# Patient Record
Sex: MALE | Race: WHITE | NOT HISPANIC OR LATINO | Employment: FULL TIME | URBAN - METROPOLITAN AREA
[De-identification: names, ages, dates, MRNs, and addresses within clinical notes are randomized per-mention and may not be internally consistent; named-entity substitution may affect disease eponyms.]

---

## 2020-06-23 ENCOUNTER — LAB VISIT (OUTPATIENT)
Dept: PRIMARY CARE CLINIC | Facility: OTHER | Age: 45
End: 2020-06-23
Payer: COMMERCIAL

## 2020-06-23 DIAGNOSIS — Z03.818 ENCOUNTER FOR OBSERVATION FOR SUSPECTED EXPOSURE TO OTHER BIOLOGICAL AGENTS RULED OUT: ICD-10-CM

## 2020-06-23 PROCEDURE — U0003 INFECTIOUS AGENT DETECTION BY NUCLEIC ACID (DNA OR RNA); SEVERE ACUTE RESPIRATORY SYNDROME CORONAVIRUS 2 (SARS-COV-2) (CORONAVIRUS DISEASE [COVID-19]), AMPLIFIED PROBE TECHNIQUE, MAKING USE OF HIGH THROUGHPUT TECHNOLOGIES AS DESCRIBED BY CMS-2020-01-R: HCPCS

## 2020-06-26 DIAGNOSIS — U07.1 COVID-19 VIRUS DETECTED: ICD-10-CM

## 2020-06-26 LAB — SARS-COV-2 RNA RESP QL NAA+PROBE: DETECTED

## 2020-10-08 DIAGNOSIS — Z00.00 ROUTINE GENERAL MEDICAL EXAMINATION AT A HEALTH CARE FACILITY: Primary | ICD-10-CM

## 2020-10-26 ENCOUNTER — OFFICE VISIT (OUTPATIENT)
Dept: INTERNAL MEDICINE | Facility: CLINIC | Age: 45
End: 2020-10-26
Payer: COMMERCIAL

## 2020-10-26 ENCOUNTER — CLINICAL SUPPORT (OUTPATIENT)
Dept: INTERNAL MEDICINE | Facility: CLINIC | Age: 45
End: 2020-10-26
Payer: COMMERCIAL

## 2020-10-26 ENCOUNTER — HOSPITAL ENCOUNTER (OUTPATIENT)
Dept: CARDIOLOGY | Facility: HOSPITAL | Age: 45
Discharge: HOME | End: 2020-10-26
Attending: FAMILY MEDICINE
Payer: COMMERCIAL

## 2020-10-26 ENCOUNTER — HOSPITAL ENCOUNTER (OUTPATIENT)
Dept: RADIOLOGY | Facility: HOSPITAL | Age: 45
Discharge: HOME | End: 2020-10-26
Attending: FAMILY MEDICINE
Payer: COMMERCIAL

## 2020-10-26 ENCOUNTER — OFFICE VISIT (OUTPATIENT)
Dept: CARDIOLOGY | Facility: CLINIC | Age: 45
End: 2020-10-26
Payer: COMMERCIAL

## 2020-10-26 VITALS
HEART RATE: 66 BPM | WEIGHT: 191.56 LBS | OXYGEN SATURATION: 98 % | DIASTOLIC BLOOD PRESSURE: 68 MMHG | SYSTOLIC BLOOD PRESSURE: 122 MMHG | BODY MASS INDEX: 27.49 KG/M2

## 2020-10-26 VITALS
DIASTOLIC BLOOD PRESSURE: 71 MMHG | HEIGHT: 70 IN | TEMPERATURE: 97 F | HEART RATE: 71 BPM | OXYGEN SATURATION: 97 % | WEIGHT: 189.63 LBS | BODY MASS INDEX: 27.15 KG/M2 | SYSTOLIC BLOOD PRESSURE: 112 MMHG

## 2020-10-26 DIAGNOSIS — R94.31 ABNORMAL ELECTROCARDIOGRAM DURING EXERCISE STRESS TEST: ICD-10-CM

## 2020-10-26 DIAGNOSIS — Z00.00 ROUTINE GENERAL MEDICAL EXAMINATION AT A HEALTH CARE FACILITY: Primary | ICD-10-CM

## 2020-10-26 DIAGNOSIS — R93.1 ELEVATED CORONARY ARTERY CALCIUM SCORE: ICD-10-CM

## 2020-10-26 DIAGNOSIS — D75.838 THROMBOCYTOSIS AFTER SPLENECTOMY: ICD-10-CM

## 2020-10-26 DIAGNOSIS — Z00.00 PREVENTATIVE HEALTH CARE: Primary | ICD-10-CM

## 2020-10-26 DIAGNOSIS — Z86.718 HISTORY OF DVT OF LOWER EXTREMITY: Primary | ICD-10-CM

## 2020-10-26 DIAGNOSIS — D64.9 ANEMIA, UNSPECIFIED TYPE: ICD-10-CM

## 2020-10-26 DIAGNOSIS — Z86.718 HISTORY OF DVT OF LOWER EXTREMITY: ICD-10-CM

## 2020-10-26 DIAGNOSIS — Z86.16 HISTORY OF 2019 NOVEL CORONAVIRUS DISEASE (COVID-19): ICD-10-CM

## 2020-10-26 DIAGNOSIS — Z00.00 ROUTINE GENERAL MEDICAL EXAMINATION AT A HEALTH CARE FACILITY: ICD-10-CM

## 2020-10-26 DIAGNOSIS — Z90.81 ASPLENIA AFTER SURGICAL PROCEDURE: Chronic | ICD-10-CM

## 2020-10-26 DIAGNOSIS — Z90.81 HISTORY OF SPLENECTOMY: Chronic | ICD-10-CM

## 2020-10-26 DIAGNOSIS — Z82.49 FAMILY HISTORY OF ARTERIOSCLEROTIC CARDIOVASCULAR DISEASE: ICD-10-CM

## 2020-10-26 DIAGNOSIS — Z90.81 THROMBOCYTOSIS AFTER SPLENECTOMY: ICD-10-CM

## 2020-10-26 DIAGNOSIS — E80.6 HYPERBILIRUBINEMIA: ICD-10-CM

## 2020-10-26 PROBLEM — R79.89 LOW TESTOSTERONE IN MALE: Status: RESOLVED | Noted: 2019-12-23 | Resolved: 2020-10-26

## 2020-10-26 PROBLEM — R79.89 LOW TESTOSTERONE IN MALE: Status: ACTIVE | Noted: 2019-12-23

## 2020-10-26 PROBLEM — D56.3 BETA THALASSEMIA MINOR: Status: ACTIVE | Noted: 2020-10-26

## 2020-10-26 LAB
ALBUMIN SERPL BCP-MCNC: 4.1 G/DL (ref 3.5–5.2)
ALP SERPL-CCNC: 48 U/L (ref 55–135)
ALT SERPL W/O P-5'-P-CCNC: 23 U/L (ref 10–44)
ANION GAP SERPL CALC-SCNC: 9 MMOL/L (ref 8–16)
AST SERPL-CCNC: 19 U/L (ref 10–40)
BILIRUB SERPL-MCNC: 2.4 MG/DL (ref 0.1–1)
BUN SERPL-MCNC: 12 MG/DL (ref 6–20)
CALCIUM SERPL-MCNC: 9.1 MG/DL (ref 8.7–10.5)
CHLORIDE SERPL-SCNC: 104 MMOL/L (ref 95–110)
CHOLEST SERPL-MCNC: 184 MG/DL (ref 120–199)
CHOLEST/HDLC SERPL: 3.6 {RATIO} (ref 2–5)
CO2 SERPL-SCNC: 27 MMOL/L (ref 23–29)
COMPLEXED PSA SERPL-MCNC: 0.33 NG/ML (ref 0–4)
CREAT SERPL-MCNC: 0.9 MG/DL (ref 0.5–1.4)
CV STRESS BASE HR: 64 BPM
DIASTOLIC BLOOD PRESSURE: 72 MMHG
ERYTHROCYTE [DISTWIDTH] IN BLOOD BY AUTOMATED COUNT: 21.7 % (ref 11.5–14.5)
EST. GFR  (AFRICAN AMERICAN): >60 ML/MIN/1.73 M^2
EST. GFR  (NON AFRICAN AMERICAN): >60 ML/MIN/1.73 M^2
ESTIMATED AVG GLUCOSE: 82 MG/DL (ref 68–131)
GLUCOSE SERPL-MCNC: 97 MG/DL (ref 70–110)
HBA1C MFR BLD HPLC: 4.5 % (ref 4–5.6)
HCT VFR BLD AUTO: 37.4 % (ref 40–54)
HDLC SERPL-MCNC: 51 MG/DL (ref 40–75)
HDLC SERPL: 27.7 % (ref 20–50)
HGB BLD-MCNC: 11.8 G/DL (ref 14–18)
LDLC SERPL CALC-MCNC: 112 MG/DL (ref 63–159)
MCH RBC QN AUTO: 21.8 PG (ref 27–31)
MCHC RBC AUTO-ENTMCNC: 31.6 G/DL (ref 32–36)
MCV RBC AUTO: 69 FL (ref 82–98)
NONHDLC SERPL-MCNC: 133 MG/DL
OHS CV CPX 1 MINUTE RECOVERY HEART RATE: 106 BPM
OHS CV CPX 85 PERCENT MAX PREDICTED HEART RATE MALE: 149
OHS CV CPX ESTIMATED METS: 10
OHS CV CPX MAX PREDICTED HEART RATE: 175
OHS CV CPX PATIENT IS FEMALE: 0
OHS CV CPX PATIENT IS MALE: 1
OHS CV CPX PEAK DIASTOLIC BLOOD PRESSURE: 80 MMHG
OHS CV CPX PEAK HEAR RATE: 151 BPM
OHS CV CPX PEAK RATE PRESSURE PRODUCT: NORMAL
OHS CV CPX PEAK SYSTOLIC BLOOD PRESSURE: 188 MMHG
OHS CV CPX PERCENT MAX PREDICTED HEART RATE ACHIEVED: 86
OHS CV CPX RATE PRESSURE PRODUCT PRESENTING: 8704
PLATELET # BLD AUTO: 469 K/UL (ref 150–350)
PMV BLD AUTO: 10.7 FL (ref 9.2–12.9)
POTASSIUM SERPL-SCNC: 3.7 MMOL/L (ref 3.5–5.1)
PROT SERPL-MCNC: 7.1 G/DL (ref 6–8.4)
RBC # BLD AUTO: 5.42 M/UL (ref 4.6–6.2)
SODIUM SERPL-SCNC: 140 MMOL/L (ref 136–145)
STRESS ECHO POST EXERCISE DUR MIN: 8 MINUTES
STRESS ST DEPRESSION: 0.5 MM
SYSTOLIC BLOOD PRESSURE: 136 MMHG
TRIGL SERPL-MCNC: 105 MG/DL (ref 30–150)
TSH SERPL DL<=0.005 MIU/L-ACNC: 0.9 UIU/ML (ref 0.4–4)
WBC # BLD AUTO: 7.26 K/UL (ref 3.9–12.7)

## 2020-10-26 PROCEDURE — 93017 CV STRESS TEST TRACING ONLY: CPT

## 2020-10-26 PROCEDURE — 93016 EXERCISE STRESS - EKG (CUPID ONLY): ICD-10-PCS | Mod: ,,, | Performed by: INTERNAL MEDICINE

## 2020-10-26 PROCEDURE — 3008F BODY MASS INDEX DOCD: CPT | Mod: CPTII,S$GLB,, | Performed by: FAMILY MEDICINE

## 2020-10-26 PROCEDURE — 99204 OFFICE O/P NEW MOD 45 MIN: CPT | Mod: S$GLB,,, | Performed by: INTERNAL MEDICINE

## 2020-10-26 PROCEDURE — 99204 PR OFFICE/OUTPT VISIT, NEW, LEVL IV, 45-59 MIN: ICD-10-PCS | Mod: S$GLB,,, | Performed by: INTERNAL MEDICINE

## 2020-10-26 PROCEDURE — 83036 HEMOGLOBIN GLYCOSYLATED A1C: CPT

## 2020-10-26 PROCEDURE — 84153 ASSAY OF PSA TOTAL: CPT

## 2020-10-26 PROCEDURE — 99999 PR PBB SHADOW E&M-EST. PATIENT-LVL III: ICD-10-PCS | Mod: PBBFAC,,, | Performed by: FAMILY MEDICINE

## 2020-10-26 PROCEDURE — 99386 PREV VISIT NEW AGE 40-64: CPT | Mod: S$GLB,,, | Performed by: FAMILY MEDICINE

## 2020-10-26 PROCEDURE — 99999 PR PBB SHADOW E&M-EST. PATIENT-LVL III: CPT | Mod: PBBFAC,,, | Performed by: INTERNAL MEDICINE

## 2020-10-26 PROCEDURE — 85027 COMPLETE CBC AUTOMATED: CPT

## 2020-10-26 PROCEDURE — 3008F PR BODY MASS INDEX (BMI) DOCUMENTED: ICD-10-PCS | Mod: CPTII,S$GLB,, | Performed by: INTERNAL MEDICINE

## 2020-10-26 PROCEDURE — 3008F BODY MASS INDEX DOCD: CPT | Mod: CPTII,S$GLB,, | Performed by: INTERNAL MEDICINE

## 2020-10-26 PROCEDURE — 84443 ASSAY THYROID STIM HORMONE: CPT

## 2020-10-26 PROCEDURE — 93016 CV STRESS TEST SUPVJ ONLY: CPT | Mod: ,,, | Performed by: INTERNAL MEDICINE

## 2020-10-26 PROCEDURE — 75571 CT CALCIUM SCORING CARDIAC: ICD-10-PCS | Mod: 26,,, | Performed by: RADIOLOGY

## 2020-10-26 PROCEDURE — 99999 PR PBB SHADOW E&M-EST. PATIENT-LVL III: ICD-10-PCS | Mod: PBBFAC,,, | Performed by: INTERNAL MEDICINE

## 2020-10-26 PROCEDURE — 80061 LIPID PANEL: CPT

## 2020-10-26 PROCEDURE — 75571 CT HRT W/O DYE W/CA TEST: CPT | Mod: 26,,, | Performed by: RADIOLOGY

## 2020-10-26 PROCEDURE — 75571 CT HRT W/O DYE W/CA TEST: CPT | Mod: TC

## 2020-10-26 PROCEDURE — 99386 PR PREVENTIVE VISIT,NEW,40-64: ICD-10-PCS | Mod: S$GLB,,, | Performed by: FAMILY MEDICINE

## 2020-10-26 PROCEDURE — 80053 COMPREHEN METABOLIC PANEL: CPT

## 2020-10-26 PROCEDURE — 99999 PR PBB SHADOW E&M-EST. PATIENT-LVL III: CPT | Mod: PBBFAC,,, | Performed by: FAMILY MEDICINE

## 2020-10-26 PROCEDURE — 3008F PR BODY MASS INDEX (BMI) DOCUMENTED: ICD-10-PCS | Mod: CPTII,S$GLB,, | Performed by: FAMILY MEDICINE

## 2020-10-26 NOTE — LETTER
SAGE Rogel MD  [facility] [address] [address]  PH: [phone]    FX: [phone]          2020        Mihir Solorio MD  [address] [facility] [address]        RE: Zach Khan,  1975    (our MRN [id number])      Dear Dr. Solorio:    I saw Zach Khan 10/26/2020 as part of [facility] program.    Zach identified you as his primary care provider, and he asked me to send you a copy of his recent test results. A copy is enclosed.    The primary encounter diagnosis was Preventative health care. Diagnoses of History of COVD-19 (2020), Anemia, unspecified type, Thrombocytosis after splenectomy, History of DVT of lower extremity, History of splenectomy, Family history of arteriosclerotic cardiovascular disease, Mildly elevated coronary artery calcium score, Nonspecific abnormal exercise cardiac stress test, and Hyperbilirubinemia were also pertinent to this visit.     If we identified any problems or concerns during his Executive Health exam, I instructed Zach to discuss these with you.    Please call me if you have any questions or if I can be of any further service.    Kind regards,     SAGE Rogel MD    Enclosure    Lab Test Results for Zach Khan,  1975    Clinical Support on 10/26/2020   Component Date Value Ref Range Status    Sodium 10/26/2020 140  136 - 145 mmol/L Final    Potassium 10/26/2020 3.7  3.5 - 5.1 mmol/L Final    Chloride 10/26/2020 104  95 - 110 mmol/L Final    CO2 10/26/2020 27  23 - 29 mmol/L Final    Glucose 10/26/2020 97  70 - 110 mg/dL Final    BUN 10/26/2020 12  6 - 20 mg/dL Final    Creatinine 10/26/2020 0.9  0.5 - 1.4 mg/dL Final    Calcium 10/26/2020 9.1  8.7 - 10.5 mg/dL Final    Total Protein 10/26/2020 7.1  6.0 - 8.4 g/dL Final    Albumin 10/26/2020 4.1  3.5 - 5.2 g/dL Final    Total Bilirubin 10/26/2020 2.4* 0.1 -  1.0 mg/dL Final    Alkaline Phosphatase 10/26/2020 48* 55 - 135 U/L Final    AST 10/26/2020 19  10 - 40 U/L Final    ALT 10/26/2020 23  10 - 44 U/L Final    Anion Gap 10/26/2020 9  8 - 16 mmol/L Final    eGFR if African American 10/26/2020 >60  >60 mL/min/1.73 m^2 Final    eGFR if non African American 10/26/2020 >60  >60 mL/min/1.73 m^2 Final    WBC 10/26/2020 7.26  3.90 - 12.70 K/uL Final    RBC 10/26/2020 5.42  4.60 - 6.20 M/uL Final    Hemoglobin 10/26/2020 11.8* 14.0 - 18.0 g/dL Final    Hematocrit 10/26/2020 37.4* 40.0 - 54.0 % Final    MCV 10/26/2020 69* 82 - 98 fL Final    MCH 10/26/2020 21.8* 27.0 - 31.0 pg Final    MCHC 10/26/2020 31.6* 32.0 - 36.0 g/dL Final    RDW 10/26/2020 21.7* 11.5 - 14.5 % Final    Platelets 10/26/2020 469* 150 - 350 K/uL Final    MPV 10/26/2020 10.7  9.2 - 12.9 fL Final    Cholesterol 10/26/2020 184  120 - 199 mg/dL Final    Triglycerides 10/26/2020 105  30 - 150 mg/dL Final    HDL 10/26/2020 51  40 - 75 mg/dL Final    LDL Cholesterol 10/26/2020 112.0  63.0 - 159.0 mg/dL Final    HDL/Cholesterol Ratio 10/26/2020 27.7  20.0 - 50.0 % Final    Total Cholesterol/HDL Ratio 10/26/2020 3.6  2.0 - 5.0 Final    Non-HDL Cholesterol 10/26/2020 133  mg/dL Final    TSH 10/26/2020 0.899  0.400 - 4.000 uIU/mL Final    Hemoglobin A1C 10/26/2020 4.5  4.0 - 5.6 % Final    Estimated Avg Glucose 10/26/2020 82  68 - 131 mg/dL Final    PSA, Screen 10/26/2020 0.33  0.00 - 4.00 ng/mL Final        CT Coronary Calcium Score Results for Zach Rohit Cloud,  1975    Results for orders placed during the hospital encounter of 10/26/20   CT Calcium Scoring Cardiac    Narrative EXAMINATION: CT CALCIUM SCORING CARDIAC    CLINICAL HISTORY: CAD risk, low, asymptomatic;  Encounter for general adult medical examination without abnormal findings    TECHNIQUE: Gated,  low dose axial images were performed over the heart.    COMPARISON: None.    FINDINGS: Important information about your  scan:    The following information is based on analysis of the coronary arteries only.  Calcium deposits do not correspond directly to the percentage of narrowing of the arteries.  They do correlate directly to the amount of coronary plaque, and to the risk of future coronary disease.  These calcium deposits usually begin to form years before any symptoms develop.  Early detection and modification of risk factors, such as smoking and cholesterol intake, and slow progression of coronary artery disease.    A low score suggests a low likelihood of coronary artery disease, but does not exclude the possibility of significant coronary artery narrowing.  The results should be discussed with your physician taking into account other risk factors such as age, gender, family history, diabetes, smoking or high cholesterol levels.    Should you experience chest pain, difficulty breathing, discomfort radiating into her neck or arm, or discomfort combined with lightheadedness, sweating, fainting or nausea, you should seek prompt medical attention.    Calcium score:  0-10: Very little coronary heart disease risk  11 through 100: Low to moderate coronary heart disease risk  101 through 400: Moderate to high coronary heart disease risk  Greater than 401: High coronary heart disease risk.    SCORE SUMMARY: Your total calcium score is 20    Incidental findings: Granuloma identified within the anterolateral margin superior segment left lower lobe.  Additional calcifications identified the left hilar region most consistent with small calcified nodes.  Findings consistent with prior granulomatous disease.  Additional finding postsurgical changes left upper quadrant abdomen.     Impression Your total calcium score is 20. (Low to Moderate coronary heart disease risk.)    Findings consistent with prior granulomatous disease.    Electronically signed by: Crow Flores MD  Date:    10/26/2020  Time:    08:37         Exercise Stress  Electrocardiogram Results for Zach Khan,  1975    Results for orders placed during the hospital encounter of 10/26/20   Exercise Stress - EKG    Narrative   The EKG portion of this study is abnormal but not diagnostic.    The patient reported no chest pain during the stress test.    The blood pressure response to stress was normal.    The exercise capacity was normal.    Lateral T wave inversions with ST depressions resolved in recovery

## 2020-10-26 NOTE — PROGRESS NOTES
Dear Zach,    Thank you for allowing me to care for you and perform your Executive Health exam on 10/26/2020. It was a pleasure meeting you.    This letter and the accompanying report will serve as a summary of the medical history you provided, your physical exam findings, and your test results. As you requested, I'm sending a copy of your test results to your PCP (primary care provider), Mihir Solorio MD. Please discuss the results of your cardiology evaluation with your PCP.    Thanks for letting me care for you, and thanks for trusting [facility] with your healthcare needs.    I look forward to the next time I can serve you. Until then, take care, and be well.    Warmest regards,     SAGE Rogel MD   EXECUTIVE HEALTH ENCOUNTER      REASON FOR VISIT  EXECUTIVE HEALTH    HEALTH MAINTENANCE INTERVENTIONS - UP TO DATE  Health Maintenance Topics with due status: Not Due       Topic Last Completion Date    Lipid Panel 10/26/2020       HEALTH MAINTENANCE INTERVENTIONS - DUE OR DUE SOON  Health Maintenance Due   Topic Date Due    Hepatitis C Screening  1975    HIV Screening  01/07/1990    TETANUS VACCINE  01/07/1993    Pneumococcal Vaccine (Highest Risk) (3 of 3 - PPSV23) 06/27/2016       PCP (Primary Care Provider)  Zach identifies his PCP as Mihir Solorio MD.    ISSUES  The following issues were identified and addressed.  1. Preventative health care    2. History of COVD-19 (06/23/2020)    3. Anemia, unspecified type    4. Thrombocytosis after splenectomy    5. History of DVT of lower extremity    6. History of splenectomy    7. Family history of arteriosclerotic cardiovascular disease    8. Mildly elevated coronary artery calcium score    9. Nonspecific abnormal exercise cardiac stress test    10. Hyperbilirubinemia        Problem List Items Addressed This Visit     Anemia    Current Assessment & Plan     Lab Results   Component Value Date    HGB 11.8 (L) 10/26/2020    HGB 12.5 (L) 06/07/2012    HCT  37.4 (L) 10/26/2020    HCT 38.8 (L) 06/07/2012   Chronic, asymptomatic, believed secondary to beta-thalassemia minor.         History of COVD-19 (06/23/2020)    Current Assessment & Plan     He appears to have recovered well.         History of DVT of lower extremity    Overview     Spontaneous -- treated with Xarelto.  Hematologist Dr. Steele did hypercoagulability work-up, which was negative. He was treated with Xarelto for just under 1 year. He was recommended to continue Xarelto life-long, but he declined.         Relevant Orders    Ambulatory referral/consult to Cardiology    History of splenectomy (Chronic)    Overview     Related to childhood trauma.         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         Current Assessment & Plan     Lab Results   Component Value Date    ALKPHOS 48 (L) 10/26/2020    ALKPHOS 48 (L) 06/07/2012    BILITOT 2.4 (H) 10/26/2020    BILITOT 1.6 (H) 06/07/2012   Asymptomatic. Believed to be secondary to asplenia.         Mildly elevated coronary artery calcium score    Current Assessment & Plan     Asymptomatic.         Relevant Orders    Ambulatory referral/consult to Cardiology    Nonspecific abnormal exercise cardiac stress test    Relevant Orders    Ambulatory referral/consult to Cardiology    Thrombocytosis after splenectomy    Current Assessment & Plan     Lab Results   Component Value Date     (H) 10/26/2020     (H) 06/07/2012   Chronic, asymptomatic, stable.            Other Visit Diagnoses     Preventative health care    -  Primary    Family history of arteriosclerotic cardiovascular disease        Relevant Orders    Ambulatory referral/consult to Cardiology          DATA SUMMARY    LABORATORY:    CBC: The complete blood count (CBC) checks for anemia and measures the red blood cells, white blood cells, and platelets in your blood.  o YOUR RESULTS: NORMAL or at least ACCEPTABLE. No further evaluation or treatment  required at this time.     CMP: The comprehensive metabolic panel (CMP) checks kidney function, liver function, sodium, potassium, glucose (sugar) and other aspects of your metabolism.  o YOUR RESULTS: NORMAL or at least ACCEPTABLE. No further evaluation or treatment required at this time.     Lipid Panel: The lipid panel measures the levels of different types of fatty substances in your blood (cholesterol and triglycerides) that can contribute to plaque buildup in your arteries (atherosclerosis).  o YOUR RESULTS: NORMAL. No further evaluation or treatment required.     TSH: The thyroid is a gland in the neck that helps regulate metabolism. The thyroid stimulating hormone (TSH) is a measure of your thyroid activity.  o YOUR RESULTS: NORMAL. No further evaluation or treatment required.     A1c: The hemoglobin A1c (A1c) is a test that evaluates and screens for diabetes.  o YOUR RESULTS: NORMAL. No further evaluation or treatment required.      CARDIOPULMONARY:   Exercise Cardiac Stress Test: This is an electrocardiogram that is done while you are exercising, such as walking on a treadmill. It is a test for assessing risk for coronary artery disease (narrowing or blockage of the arteries that provide oxygen and nutrients to the heart).  o YOUR RESULTS: MILDLY ABNORMAL. You have been referred to cardiology for further evaluation.  You need to discuss this test result with your primary care provider (PCP) within 3 months.     CT Cardiac Calcium Scoring:  This is a test that calculates your risk of developing coronary artery disease (CAD) by measuring the amount of calcified plaque in the coronary arteries.  o YOUR RESULTS: MILDLY ABNORMAL. You have been referred to cardiology for further evaluation.  You need to discuss this test result with your primary care provider (PCP) within 3 months.      CANCER SCREENING:   PSA: Prostate specific antigen (PSA) is a test used to screen for and monitor prostate cancer in  "men.  o YOUR RESULTS: NORMAL. No further evaluation or treatment required.      PHYSICAL EXAM  His body mass index is 27.2 kg/m². His  height is 5' 10" (1.778 m) and weight is 86 kg (189 lb 9.5 oz). His tympanic temperature is 97 °F (36.1 °C). His blood pressure is 112/71 and his pulse is 71. His oxygen saturation is 97%.    Physical Exam  Vitals signs reviewed.   Constitutional:       General: He is not in acute distress.     Appearance: Normal appearance.   Eyes:      General: No scleral icterus.     Conjunctiva/sclera: Conjunctivae normal.   Neck:      Musculoskeletal: No muscular tenderness.      Vascular: No carotid bruit.   Cardiovascular:      Rate and Rhythm: Normal rate and regular rhythm.      Heart sounds: Normal heart sounds.   Pulmonary:      Effort: Pulmonary effort is normal. No respiratory distress.      Breath sounds: Normal breath sounds. No wheezing or rhonchi.   Abdominal:      General: Bowel sounds are normal. There is no distension.      Palpations: Abdomen is soft. There is no mass.      Tenderness: There is no abdominal tenderness.   Lymphadenopathy:      Cervical: No cervical adenopathy.   Skin:     General: Skin is warm and dry.      Coloration: Skin is not jaundiced.   Neurological:      General: No focal deficit present.      Mental Status: He is alert and oriented to person, place, and time.   Psychiatric:         Mood and Affect: Mood normal.         Behavior: Behavior normal.         Judgment: Judgment normal.         MEDICATIONS  He currently has no medications in their medication list.  There are no discontinued medications.       REVIEW OF SYSTEMS  Review of Systems   Constitutional: Negative for chills and fever.   HENT: Negative for ear pain and trouble swallowing.    Eyes: Negative for pain and visual disturbance.   Respiratory: Negative for chest tightness and shortness of breath.    Cardiovascular: Negative for chest pain and palpitations.   Gastrointestinal: Negative for " "abdominal pain and blood in stool.   Endocrine: Negative for polydipsia and polyuria.   Genitourinary: Negative.  Negative for difficulty urinating, dysuria and hematuria.   Musculoskeletal: Negative for gait problem and joint swelling.   Integumentary:  Negative for rash and wound.   Neurological: Negative for vertigo and syncope.   Hematological: Negative.    Psychiatric/Behavioral: Negative for agitation and confusion.       FOLLOW-UP  He is to follow up with his PCP for any "Health Maintenance Interventions - Due Or Due Soon" listed above, for any unresolved issues addressed during this encounter, and for any new complaints or concerns.    PAST MEDICAL HISTORY  He has a past medical history of Beta thalassemia minor, History of COVD-19 (2020), History of splenectomy, and Mildly elevated coronary artery calcium score.    SURGICAL HISTORY  He has a past surgical history that includes Hernia repair and spline removal.    FAMILY HISTORY  His family history includes No Known Problems in his father and mother.     ALLERGIES  He has No Known Allergies.    SOCIAL HISTORY   TOBACCO USE HISTORY: He reports that he has never smoked. He does not have any smokeless tobacco history on file.   ALCOHOL USE HISTORY:  He reports current alcohol use.    Documentation entered by me for this encounter may have been done in part using speech-recognition technology. Although I have made an effort to ensure accuracy, "sound like" errors may exist and should be interpreted in context. -SAGE Rogel MD.       Lab Test Results for Zach Khan,  1975    Clinical Support on 10/26/2020   Component Date Value Ref Range Status    Sodium 10/26/2020 140  136 - 145 mmol/L Final    Potassium 10/26/2020 3.7  3.5 - 5.1 mmol/L Final    Chloride 10/26/2020 104  95 - 110 mmol/L Final    CO2 10/26/2020 27  23 - 29 mmol/L Final    Glucose 10/26/2020 97  70 - 110 mg/dL Final    BUN 10/26/2020 12  6 - 20 mg/dL Final    " Creatinine 10/26/2020 0.9  0.5 - 1.4 mg/dL Final    Calcium 10/26/2020 9.1  8.7 - 10.5 mg/dL Final    Total Protein 10/26/2020 7.1  6.0 - 8.4 g/dL Final    Albumin 10/26/2020 4.1  3.5 - 5.2 g/dL Final    Total Bilirubin 10/26/2020 2.4* 0.1 - 1.0 mg/dL Final    Alkaline Phosphatase 10/26/2020 48* 55 - 135 U/L Final    AST 10/26/2020 19  10 - 40 U/L Final    ALT 10/26/2020 23  10 - 44 U/L Final    Anion Gap 10/26/2020 9  8 - 16 mmol/L Final    eGFR if African American 10/26/2020 >60  >60 mL/min/1.73 m^2 Final    eGFR if non African American 10/26/2020 >60  >60 mL/min/1.73 m^2 Final    WBC 10/26/2020 7.26  3.90 - 12.70 K/uL Final    RBC 10/26/2020 5.42  4.60 - 6.20 M/uL Final    Hemoglobin 10/26/2020 11.8* 14.0 - 18.0 g/dL Final    Hematocrit 10/26/2020 37.4* 40.0 - 54.0 % Final    MCV 10/26/2020 69* 82 - 98 fL Final    MCH 10/26/2020 21.8* 27.0 - 31.0 pg Final    MCHC 10/26/2020 31.6* 32.0 - 36.0 g/dL Final    RDW 10/26/2020 21.7* 11.5 - 14.5 % Final    Platelets 10/26/2020 469* 150 - 350 K/uL Final    MPV 10/26/2020 10.7  9.2 - 12.9 fL Final    Cholesterol 10/26/2020 184  120 - 199 mg/dL Final    Triglycerides 10/26/2020 105  30 - 150 mg/dL Final    HDL 10/26/2020 51  40 - 75 mg/dL Final    LDL Cholesterol 10/26/2020 112.0  63.0 - 159.0 mg/dL Final    HDL/Cholesterol Ratio 10/26/2020 27.7  20.0 - 50.0 % Final    Total Cholesterol/HDL Ratio 10/26/2020 3.6  2.0 - 5.0 Final    Non-HDL Cholesterol 10/26/2020 133  mg/dL Final    TSH 10/26/2020 0.899  0.400 - 4.000 uIU/mL Final    Hemoglobin A1C 10/26/2020 4.5  4.0 - 5.6 % Final    Estimated Avg Glucose 10/26/2020 82  68 - 131 mg/dL Final    PSA, Screen 10/26/2020 0.33  0.00 - 4.00 ng/mL Final        CT Coronary Calcium Score Results for Danny Ramos 1975    Results for orders placed during the hospital encounter of 10/26/20   CT Calcium Scoring Cardiac    Narrative EXAMINATION: CT CALCIUM SCORING CARDIAC    CLINICAL HISTORY:  CAD risk, low, asymptomatic;  Encounter for general adult medical examination without abnormal findings    TECHNIQUE: Gated,  low dose axial images were performed over the heart.    COMPARISON: None.    FINDINGS: Important information about your scan:    The following information is based on analysis of the coronary arteries only.  Calcium deposits do not correspond directly to the percentage of narrowing of the arteries.  They do correlate directly to the amount of coronary plaque, and to the risk of future coronary disease.  These calcium deposits usually begin to form years before any symptoms develop.  Early detection and modification of risk factors, such as smoking and cholesterol intake, and slow progression of coronary artery disease.    A low score suggests a low likelihood of coronary artery disease, but does not exclude the possibility of significant coronary artery narrowing.  The results should be discussed with your physician taking into account other risk factors such as age, gender, family history, diabetes, smoking or high cholesterol levels.    Should you experience chest pain, difficulty breathing, discomfort radiating into her neck or arm, or discomfort combined with lightheadedness, sweating, fainting or nausea, you should seek prompt medical attention.    Calcium score:  0-10: Very little coronary heart disease risk  11 through 100: Low to moderate coronary heart disease risk  101 through 400: Moderate to high coronary heart disease risk  Greater than 401: High coronary heart disease risk.    SCORE SUMMARY: Your total calcium score is 20    Incidental findings: Granuloma identified within the anterolateral margin superior segment left lower lobe.  Additional calcifications identified the left hilar region most consistent with small calcified nodes.  Findings consistent with prior granulomatous disease.  Additional finding postsurgical changes left upper quadrant abdomen.     Impression Your  total calcium score is 20. (Low to Moderate coronary heart disease risk.)    Findings consistent with prior granulomatous disease.    Electronically signed by: Crow Flores MD  Date:    10/26/2020  Time:    08:37         Exercise Stress Electrocardiogram Results for Zach Bee Danny Khan 1975    Results for orders placed during the hospital encounter of 10/26/20   Exercise Stress - EKG    Narrative   The EKG portion of this study is abnormal but not diagnostic.    The patient reported no chest pain during the stress test.    The blood pressure response to stress was normal.    The exercise capacity was normal.    Lateral T wave inversions with ST depressions resolved in recovery

## 2020-10-26 NOTE — LETTER
Dear Zach,    Thank you for allowing me to care for you and perform your Executive Health exam on 10/26/2020. It was a pleasure meeting you.    This letter and the accompanying report will serve as a summary of the medical history you provided, your physical exam findings, and your test results. As you requested, I'm sending a copy of your test results to your PCP (primary care provider), Mihir Solorio MD. Please discuss the results of your cardiology evaluation with your PCP.    Thanks for letting me care for you, and thanks for trusting [facility] with your healthcare needs.    I look forward to the next time I can serve you. Until then, take care, and be well.    Warmest regards,     SAGE Rogel MD   EXECUTIVE HEALTH ENCOUNTER      REASON FOR VISIT  EXECUTIVE HEALTH    HEALTH MAINTENANCE INTERVENTIONS - UP TO DATE  Health Maintenance Topics with due status: Not Due       Topic Last Completion Date    Lipid Panel 10/26/2020       HEALTH MAINTENANCE INTERVENTIONS - DUE OR DUE SOON  Health Maintenance Due   Topic Date Due    Hepatitis C Screening  1975    HIV Screening  01/07/1990    TETANUS VACCINE  01/07/1993    Pneumococcal Vaccine (Highest Risk) (3 of 3 - PPSV23) 06/27/2016       PCP (Primary Care Provider)  Zach identifies his PCP as Mihir Solorio MD.    ISSUES  The following issues were identified and addressed.  1. Preventative health care    2. History of COVD-19 (06/23/2020)    3. Anemia, unspecified type    4. Thrombocytosis after splenectomy    5. History of DVT of lower extremity    6. History of splenectomy    7. Family history of arteriosclerotic cardiovascular disease    8. Mildly elevated coronary artery calcium score    9. Nonspecific abnormal exercise cardiac stress test    10. Hyperbilirubinemia        Problem List Items Addressed This Visit     Anemia    Current Assessment & Plan     Lab Results   Component Value Date    HGB 11.8 (L) 10/26/2020    HGB 12.5 (L) 06/07/2012     HCT 37.4 (L) 10/26/2020    HCT 38.8 (L) 06/07/2012   Chronic, asymptomatic, believed secondary to beta-thalassemia minor.         History of COVD-19 (06/23/2020)    Current Assessment & Plan     He appears to have recovered well.         History of DVT of lower extremity    Overview     Spontaneous -- treated with Xarelto.  Hematologist Dr. Steele did hypercoagulability work-up, which was negative. He was treated with Xarelto for just under 1 year. He was recommended to continue Xarelto life-long, but he declined.         Relevant Orders    Ambulatory referral/consult to Cardiology    History of splenectomy (Chronic)    Overview     Related to childhood trauma.         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         Current Assessment & Plan     Lab Results   Component Value Date    ALKPHOS 48 (L) 10/26/2020    ALKPHOS 48 (L) 06/07/2012    BILITOT 2.4 (H) 10/26/2020    BILITOT 1.6 (H) 06/07/2012   Asymptomatic. Believed to be secondary to asplenia.         Mildly elevated coronary artery calcium score    Current Assessment & Plan     Asymptomatic.         Relevant Orders    Ambulatory referral/consult to Cardiology    Nonspecific abnormal exercise cardiac stress test    Relevant Orders    Ambulatory referral/consult to Cardiology    Thrombocytosis after splenectomy    Current Assessment & Plan     Lab Results   Component Value Date     (H) 10/26/2020     (H) 06/07/2012   Chronic, asymptomatic, stable.            Other Visit Diagnoses     Preventative health care    -  Primary    Family history of arteriosclerotic cardiovascular disease        Relevant Orders    Ambulatory referral/consult to Cardiology          DATA SUMMARY    LABORATORY:    CBC: The complete blood count (CBC) checks for anemia and measures the red blood cells, white blood cells, and platelets in your blood.  o YOUR RESULTS: NORMAL or at least ACCEPTABLE. No further evaluation or  treatment required at this time.     CMP: The comprehensive metabolic panel (CMP) checks kidney function, liver function, sodium, potassium, glucose (sugar) and other aspects of your metabolism.  o YOUR RESULTS: NORMAL or at least ACCEPTABLE. No further evaluation or treatment required at this time.     Lipid Panel: The lipid panel measures the levels of different types of fatty substances in your blood (cholesterol and triglycerides) that can contribute to plaque buildup in your arteries (atherosclerosis).  o YOUR RESULTS: NORMAL. No further evaluation or treatment required.     TSH: The thyroid is a gland in the neck that helps regulate metabolism. The thyroid stimulating hormone (TSH) is a measure of your thyroid activity.  o YOUR RESULTS: NORMAL. No further evaluation or treatment required.     A1c: The hemoglobin A1c (A1c) is a test that evaluates and screens for diabetes.  o YOUR RESULTS: NORMAL. No further evaluation or treatment required.      CARDIOPULMONARY:   Exercise Cardiac Stress Test: This is an electrocardiogram that is done while you are exercising, such as walking on a treadmill. It is a test for assessing risk for coronary artery disease (narrowing or blockage of the arteries that provide oxygen and nutrients to the heart).  o YOUR RESULTS: MILDLY ABNORMAL. You have been referred to cardiology for further evaluation.  You need to discuss this test result with your primary care provider (PCP) within 3 months.     CT Cardiac Calcium Scoring:  This is a test that calculates your risk of developing coronary artery disease (CAD) by measuring the amount of calcified plaque in the coronary arteries.  o YOUR RESULTS: MILDLY ABNORMAL. You have been referred to cardiology for further evaluation.  You need to discuss this test result with your primary care provider (PCP) within 3 months.      CANCER SCREENING:   PSA: Prostate specific antigen (PSA) is a test used to screen for and monitor prostate  "cancer in men.  o YOUR RESULTS: NORMAL. No further evaluation or treatment required.      PHYSICAL EXAM  His body mass index is 27.2 kg/m². His  height is 5' 10" (1.778 m) and weight is 86 kg (189 lb 9.5 oz). His tympanic temperature is 97 °F (36.1 °C). His blood pressure is 112/71 and his pulse is 71. His oxygen saturation is 97%.    Physical Exam  Vitals signs reviewed.   Constitutional:       General: He is not in acute distress.     Appearance: Normal appearance.   Eyes:      General: No scleral icterus.     Conjunctiva/sclera: Conjunctivae normal.   Neck:      Musculoskeletal: No muscular tenderness.      Vascular: No carotid bruit.   Cardiovascular:      Rate and Rhythm: Normal rate and regular rhythm.      Heart sounds: Normal heart sounds.   Pulmonary:      Effort: Pulmonary effort is normal. No respiratory distress.      Breath sounds: Normal breath sounds. No wheezing or rhonchi.   Abdominal:      General: Bowel sounds are normal. There is no distension.      Palpations: Abdomen is soft. There is no mass.      Tenderness: There is no abdominal tenderness.   Lymphadenopathy:      Cervical: No cervical adenopathy.   Skin:     General: Skin is warm and dry.      Coloration: Skin is not jaundiced.   Neurological:      General: No focal deficit present.      Mental Status: He is alert and oriented to person, place, and time.   Psychiatric:         Mood and Affect: Mood normal.         Behavior: Behavior normal.         Judgment: Judgment normal.         MEDICATIONS  He currently has no medications in their medication list.  There are no discontinued medications.       REVIEW OF SYSTEMS  Review of Systems   Constitutional: Negative for chills and fever.   HENT: Negative for ear pain and trouble swallowing.    Eyes: Negative for pain and visual disturbance.   Respiratory: Negative for chest tightness and shortness of breath.    Cardiovascular: Negative for chest pain and palpitations.   Gastrointestinal: Negative " "for abdominal pain and blood in stool.   Endocrine: Negative for polydipsia and polyuria.   Genitourinary: Negative.  Negative for difficulty urinating, dysuria and hematuria.   Musculoskeletal: Negative for gait problem and joint swelling.   Integumentary:  Negative for rash and wound.   Neurological: Negative for vertigo and syncope.   Hematological: Negative.    Psychiatric/Behavioral: Negative for agitation and confusion.       FOLLOW-UP  He is to follow up with his PCP for any "Health Maintenance Interventions - Due Or Due Soon" listed above, for any unresolved issues addressed during this encounter, and for any new complaints or concerns.    PAST MEDICAL HISTORY  He has a past medical history of Beta thalassemia minor, History of COVD-19 (2020), History of splenectomy, and Mildly elevated coronary artery calcium score.    SURGICAL HISTORY  He has a past surgical history that includes Hernia repair and spline removal.    FAMILY HISTORY  His family history includes No Known Problems in his father and mother.     ALLERGIES  He has No Known Allergies.    SOCIAL HISTORY   TOBACCO USE HISTORY: He reports that he has never smoked. He does not have any smokeless tobacco history on file.   ALCOHOL USE HISTORY:  He reports current alcohol use.    Documentation entered by me for this encounter may have been done in part using speech-recognition technology. Although I have made an effort to ensure accuracy, "sound like" errors may exist and should be interpreted in context. -SAGE Rogel MD.       Lab Test Results for Zach Khan,  1975    Clinical Support on 10/26/2020   Component Date Value Ref Range Status    Sodium 10/26/2020 140  136 - 145 mmol/L Final    Potassium 10/26/2020 3.7  3.5 - 5.1 mmol/L Final    Chloride 10/26/2020 104  95 - 110 mmol/L Final    CO2 10/26/2020 27  23 - 29 mmol/L Final    Glucose 10/26/2020 97  70 - 110 mg/dL Final    BUN 10/26/2020 12  6 - 20 mg/dL Final    " Creatinine 10/26/2020 0.9  0.5 - 1.4 mg/dL Final    Calcium 10/26/2020 9.1  8.7 - 10.5 mg/dL Final    Total Protein 10/26/2020 7.1  6.0 - 8.4 g/dL Final    Albumin 10/26/2020 4.1  3.5 - 5.2 g/dL Final    Total Bilirubin 10/26/2020 2.4* 0.1 - 1.0 mg/dL Final    Alkaline Phosphatase 10/26/2020 48* 55 - 135 U/L Final    AST 10/26/2020 19  10 - 40 U/L Final    ALT 10/26/2020 23  10 - 44 U/L Final    Anion Gap 10/26/2020 9  8 - 16 mmol/L Final    eGFR if African American 10/26/2020 >60  >60 mL/min/1.73 m^2 Final    eGFR if non African American 10/26/2020 >60  >60 mL/min/1.73 m^2 Final    WBC 10/26/2020 7.26  3.90 - 12.70 K/uL Final    RBC 10/26/2020 5.42  4.60 - 6.20 M/uL Final    Hemoglobin 10/26/2020 11.8* 14.0 - 18.0 g/dL Final    Hematocrit 10/26/2020 37.4* 40.0 - 54.0 % Final    MCV 10/26/2020 69* 82 - 98 fL Final    MCH 10/26/2020 21.8* 27.0 - 31.0 pg Final    MCHC 10/26/2020 31.6* 32.0 - 36.0 g/dL Final    RDW 10/26/2020 21.7* 11.5 - 14.5 % Final    Platelets 10/26/2020 469* 150 - 350 K/uL Final    MPV 10/26/2020 10.7  9.2 - 12.9 fL Final    Cholesterol 10/26/2020 184  120 - 199 mg/dL Final    Triglycerides 10/26/2020 105  30 - 150 mg/dL Final    HDL 10/26/2020 51  40 - 75 mg/dL Final    LDL Cholesterol 10/26/2020 112.0  63.0 - 159.0 mg/dL Final    HDL/Cholesterol Ratio 10/26/2020 27.7  20.0 - 50.0 % Final    Total Cholesterol/HDL Ratio 10/26/2020 3.6  2.0 - 5.0 Final    Non-HDL Cholesterol 10/26/2020 133  mg/dL Final    TSH 10/26/2020 0.899  0.400 - 4.000 uIU/mL Final    Hemoglobin A1C 10/26/2020 4.5  4.0 - 5.6 % Final    Estimated Avg Glucose 10/26/2020 82  68 - 131 mg/dL Final    PSA, Screen 10/26/2020 0.33  0.00 - 4.00 ng/mL Final        CT Coronary Calcium Score Results for Danny Ramos 1975    Results for orders placed during the hospital encounter of 10/26/20   CT Calcium Scoring Cardiac    Narrative EXAMINATION: CT CALCIUM SCORING CARDIAC    CLINICAL HISTORY:  CAD risk, low, asymptomatic;  Encounter for general adult medical examination without abnormal findings    TECHNIQUE: Gated,  low dose axial images were performed over the heart.    COMPARISON: None.    FINDINGS: Important information about your scan:    The following information is based on analysis of the coronary arteries only.  Calcium deposits do not correspond directly to the percentage of narrowing of the arteries.  They do correlate directly to the amount of coronary plaque, and to the risk of future coronary disease.  These calcium deposits usually begin to form years before any symptoms develop.  Early detection and modification of risk factors, such as smoking and cholesterol intake, and slow progression of coronary artery disease.    A low score suggests a low likelihood of coronary artery disease, but does not exclude the possibility of significant coronary artery narrowing.  The results should be discussed with your physician taking into account other risk factors such as age, gender, family history, diabetes, smoking or high cholesterol levels.    Should you experience chest pain, difficulty breathing, discomfort radiating into her neck or arm, or discomfort combined with lightheadedness, sweating, fainting or nausea, you should seek prompt medical attention.    Calcium score:  0-10: Very little coronary heart disease risk  11 through 100: Low to moderate coronary heart disease risk  101 through 400: Moderate to high coronary heart disease risk  Greater than 401: High coronary heart disease risk.    SCORE SUMMARY: Your total calcium score is 20    Incidental findings: Granuloma identified within the anterolateral margin superior segment left lower lobe.  Additional calcifications identified the left hilar region most consistent with small calcified nodes.  Findings consistent with prior granulomatous disease.  Additional finding postsurgical changes left upper quadrant abdomen.     Impression Your  total calcium score is 20. (Low to Moderate coronary heart disease risk.)    Findings consistent with prior granulomatous disease.    Electronically signed by: Crow Flores MD  Date:    10/26/2020  Time:    08:37         Exercise Stress Electrocardiogram Results for Zach Bee Danny Khan 1975    Results for orders placed during the hospital encounter of 10/26/20   Exercise Stress - EKG    Narrative   The EKG portion of this study is abnormal but not diagnostic.    The patient reported no chest pain during the stress test.    The blood pressure response to stress was normal.    The exercise capacity was normal.    Lateral T wave inversions with ST depressions resolved in recovery

## 2020-10-26 NOTE — PROGRESS NOTES
Subjective:   Patient ID:  Zach Khan is a 45 y.o. male who presents for evaluation of new pt (hx DVT)   This patient has a history of DVT over 10 years ago on the right lower leg from skiing and possible trauma at that time.  The patient has had no recurrence.  Patient is nonsmoker with no exertional symptoms.  Stress test today showed excellent exercise endurance to 10.1 Mets of activity.  Asymptomatic stress test with no EKG changes.  Lipid profiles have been quite stable HDL 51, LDL of 112.  The patient is not obese.  Executive physical CT calcium score of the chest showed result of 20.  This is considered a low risk for significant coronary disease.    Pleasant 45-year-old male who presents to the office for evaluation for risk assessment for coronary disease.  Patient has not had elevated cholesterol level.  Patient is nonsmoker.  Remote family history of coronary disease is known with maternal uncle having CAD.  The patient does not have diabetes and is not overweight.  Patient exercises regularly.  Patient feels well has no symptoms.    Family History   Problem Relation Age of Onset    No Known Problems Mother     No Known Problems Father      Past Medical History:   Diagnosis Date    Beta thalassemia minor 10/26/2020    History of COVD-19 (06/23/2020) 10/26/2020    History of splenectomy 5/2/2016     Social History     Socioeconomic History    Marital status: Single     Spouse name: Not on file    Number of children: Not on file    Years of education: Not on file    Highest education level: Not on file   Occupational History    Not on file   Social Needs    Financial resource strain: Not on file    Food insecurity     Worry: Not on file     Inability: Not on file    Transportation needs     Medical: Not on file     Non-medical: Not on file   Tobacco Use    Smoking status: Never Smoker   Substance and Sexual Activity    Alcohol use: Yes     Comment: rarely    Drug use: Never    Sexual  activity: Yes     Partners: Female   Lifestyle    Physical activity     Days per week: Not on file     Minutes per session: Not on file    Stress: Not on file   Relationships    Social connections     Talks on phone: Not on file     Gets together: Not on file     Attends Hoahaoism service: Not on file     Active member of club or organization: Not on file     Attends meetings of clubs or organizations: Not on file     Relationship status: Not on file   Other Topics Concern    Not on file   Social History Narrative    Not on file     No current outpatient medications on file prior to visit.     No current facility-administered medications on file prior to visit.      Review of patient's allergies indicates:  No Known Allergies    Review of Systems   Constitution: Negative for chills, diaphoresis, night sweats, weight gain and weight loss.   HENT: Negative for congestion, hoarse voice, sore throat and stridor.    Eyes: Negative for double vision and pain.   Cardiovascular: Negative for chest pain, claudication, cyanosis, dyspnea on exertion, irregular heartbeat, leg swelling, near-syncope, orthopnea, palpitations, paroxysmal nocturnal dyspnea and syncope.   Respiratory: Negative for cough, hemoptysis, shortness of breath, sleep disturbances due to breathing, snoring, sputum production and wheezing.    Endocrine: Negative for cold intolerance, heat intolerance and polydipsia.   Hematologic/Lymphatic: Negative for bleeding problem. Does not bruise/bleed easily.   Skin: Negative for color change, dry skin and rash.   Musculoskeletal: Negative for joint swelling and muscle cramps.   Gastrointestinal: Negative for bloating, abdominal pain, constipation, diarrhea, dysphagia, melena, nausea and vomiting.   Genitourinary: Negative for flank pain and urgency.   Neurological: Negative for dizziness, focal weakness, headaches, light-headedness, loss of balance, seizures and weakness.   Psychiatric/Behavioral: Negative for  altered mental status and memory loss. The patient is not nervous/anxious.          Objective:     Physical Exam   Constitutional: He is oriented to person, place, and time. He appears well-developed and well-nourished.   HENT:   Head: Normocephalic.   Eyes: Pupils are equal, round, and reactive to light.   Neck: Normal range of motion. No tracheal deviation present.   Cardiovascular: Normal rate, regular rhythm, normal heart sounds and intact distal pulses. Exam reveals no gallop and no friction rub.   No murmur heard.  Pulses:       Carotid pulses are 2+ on the right side and 2+ on the left side.       Radial pulses are 2+ on the right side and 2+ on the left side.        Femoral pulses are 2+ on the right side and 2+ on the left side.       Popliteal pulses are 2+ on the right side and 2+ on the left side.        Dorsalis pedis pulses are 2+ on the right side and 2+ on the left side.        Posterior tibial pulses are 2+ on the right side and 2+ on the left side.   Pulmonary/Chest: Effort normal and breath sounds normal. No stridor. No respiratory distress. He has no wheezes. He has no rales. He exhibits no tenderness.   Abdominal: He exhibits no distension. There is no abdominal tenderness. There is no rebound.   Musculoskeletal:         General: No tenderness or edema.   Neurological: He is alert and oriented to person, place, and time.   Skin: Skin is warm and dry.        Ref Range & Units 08:13 8yr ago   Cholesterol 120 - 199 mg/dL 184  190 CM    Comment: The National Cholesterol Education Program (NCEP) has set the   following guidelines (reference ranges) for Cholesterol:   Optimal.....................<200 mg/dL   Borderline High.............200-239 mg/dL   High........................> or = 240 mg/dL    Triglycerides 30 - 150 mg/dL 105  71 CM    Comment: The National Cholesterol Education Program (NCEP) has set the   following guidelines (reference values) for triglycerides:    Normal......................<150 mg/dL   Borderline High.............150-199 mg/dL   High........................200-499 mg/dL    HDL 40 - 75 mg/dL 51  50 CM    Comment: The National Cholesterol Education Program (NCEP) has set the   following guidelines (reference values) for HDL Cholesterol:   Low...............<40 mg/dL   Optimal...........>60 mg/dL    LDL Cholesterol 63.0 - 159.0 mg/dL 112.0  126.0 R, CM    Comment: The National Cholesterol Education Program (NCEP) has set the      Exercise Test Summary  Phase Stage Time Speed Grade HR BP Comment  Name Name in Stage (mph) (%) (bpm) (mmHg)  00:00 0.0 0.0 65  PRETEST SUPINE 00:03 0.0 0.0 66  STANDING 00:01 0.0 0.0 66  WARM-UP 03:47 0.0 0.0 74 136/72  Exercise STAGE 1 03:00 1.7 10.0 108 156/67  STAGE 2 03:00 2.5 12.0 133 171/77  STAGE 3 02:31 3.4 14.0 151  Recovery 06:25 0.0 0.0 75 133/75  The patient exercised according to the SIDNEY for 08:31 min:s, achieving a work level of Max. METS:  10.1. The resting heart rate of 70 bpm padmaja to a maximal heart rate of 151 bpm. This value represents 86  % of the maximal, age-predicted heart rate. The resting blood pressure of 136/72 mmHg , padmaja to a  maximum blood pressure of 189/62 mmHg. The exercise test was stopped due to Target Heart Rate achieved.       Assessment:     1. History of DVT of lower extremity    2. Family history of arteriosclerotic cardiovascular disease    3. Mildly elevated coronary artery calcium score          Plan:     1.  History of DVT:  Stable observe no recurrence  2.  Family history of coronary disease:  Continued surveillance of low-cholesterol diet and repeat lipid profile next year.  Patient is a nonsmoker with no symptoms.  3.  Mildly elevated calcium score of the coronary arteries:  We had a discussion today about the low risk for the patient developing significant symptoms or coronary artery events in the next 10 years of 1.5% .  This patient has no desire to be on statin medication and  given the low risk for cardiac events in the next 10 years I would advise continue surveillance with healthy lifestyle.  I answered all the patient's questions regarding presumed risk of developing CAD.  I will be available for follow-up in the future if necessary.

## 2020-10-26 NOTE — LETTER
October 26, 2020      SAGE Rogel MD  [address] [place] [address]           [facility]  [address] [place] [address]  Phone: [phone]  Fax: [phone]          Patient: Zach Khan   MR Number: [id number]   YOB: 1975   Date of Visit: 10/26/2020       Dear Dr. SAGE Rogel:    Thank you for referring Zach Khan to me for evaluation. Attached you will find relevant portions of my assessment and plan of care.    If you have questions, please do not hesitate to call me. I look forward to following Zach Khan along with you.    Sincerely,    Zohaib New MD    Enclosure  CC:  No Recipients    If you would like to receive this communication electronically, please contact [email or web address] or [phone] to request more information on [facility] access.    For providers and/or their staff who would like to refer a patient to [facility], please contact us through our one-stop-shop provider referral line, [facility], at [phone].    If you feel you have received this communication in error or would no longer like to receive these types of communications, please e-mail [email or web address]

## 2020-11-01 PROBLEM — E80.6 HYPERBILIRUBINEMIA: Status: ACTIVE | Noted: 2020-11-01

## 2020-11-01 PROBLEM — D75.838 THROMBOCYTOSIS AFTER SPLENECTOMY: Status: ACTIVE | Noted: 2020-11-01

## 2020-11-01 PROBLEM — R94.31 ABNORMAL ELECTROCARDIOGRAM DURING EXERCISE STRESS TEST: Status: ACTIVE | Noted: 2020-11-01

## 2020-11-01 PROBLEM — R93.1 ELEVATED CORONARY ARTERY CALCIUM SCORE: Status: ACTIVE | Noted: 2020-11-01

## 2020-11-01 PROBLEM — Z90.81 ASPLENIA AFTER SURGICAL PROCEDURE: Chronic | Status: ACTIVE | Noted: 2020-11-01

## 2020-11-01 PROBLEM — Z90.81 THROMBOCYTOSIS AFTER SPLENECTOMY: Status: ACTIVE | Noted: 2020-11-01

## 2020-11-01 PROBLEM — D64.9 ANEMIA: Status: ACTIVE | Noted: 2020-11-01

## 2020-11-01 NOTE — ASSESSMENT & PLAN NOTE
Lab Results   Component Value Date    HGB 11.8 (L) 10/26/2020    HGB 12.5 (L) 06/07/2012    HCT 37.4 (L) 10/26/2020    HCT 38.8 (L) 06/07/2012   Chronic, asymptomatic, believed secondary to beta-thalassemia minor.

## 2020-11-01 NOTE — ASSESSMENT & PLAN NOTE
Lab Results   Component Value Date    ALKPHOS 48 (L) 10/26/2020    ALKPHOS 48 (L) 06/07/2012    BILITOT 2.4 (H) 10/26/2020    BILITOT 1.6 (H) 06/07/2012   Asymptomatic. Believed to be secondary to asplenia.

## 2020-11-01 NOTE — ASSESSMENT & PLAN NOTE
Lab Results   Component Value Date     (H) 10/26/2020     (H) 06/07/2012   Chronic, asymptomatic, stable.

## 2022-02-14 ENCOUNTER — PATIENT MESSAGE (OUTPATIENT)
Dept: PHYSICAL MEDICINE AND REHAB | Facility: CLINIC | Age: 47
End: 2022-02-14
Payer: COMMERCIAL

## 2022-02-16 ENCOUNTER — PATIENT MESSAGE (OUTPATIENT)
Dept: RESEARCH | Facility: HOSPITAL | Age: 47
End: 2022-02-16
Payer: COMMERCIAL

## 2023-09-05 DIAGNOSIS — Z00.00 ROUTINE GENERAL MEDICAL EXAMINATION AT A HEALTH CARE FACILITY: Primary | ICD-10-CM

## 2023-09-21 ENCOUNTER — HOSPITAL ENCOUNTER (OUTPATIENT)
Dept: CARDIOLOGY | Facility: HOSPITAL | Age: 48
Discharge: HOME | End: 2023-09-21
Attending: INTERNAL MEDICINE

## 2023-09-21 ENCOUNTER — CLINICAL SUPPORT (OUTPATIENT)
Dept: INTERNAL MEDICINE | Facility: CLINIC | Age: 48
End: 2023-09-21

## 2023-09-21 ENCOUNTER — CLINICAL SUPPORT (OUTPATIENT)
Dept: INTERNAL MEDICINE | Facility: CLINIC | Age: 48
End: 2023-09-21
Payer: COMMERCIAL

## 2023-09-21 ENCOUNTER — OFFICE VISIT (OUTPATIENT)
Dept: INTERNAL MEDICINE | Facility: CLINIC | Age: 48
End: 2023-09-21
Payer: COMMERCIAL

## 2023-09-21 ENCOUNTER — HOSPITAL ENCOUNTER (OUTPATIENT)
Dept: RADIOLOGY | Facility: HOSPITAL | Age: 48
Discharge: HOME | End: 2023-09-21
Attending: INTERNAL MEDICINE

## 2023-09-21 VITALS
SYSTOLIC BLOOD PRESSURE: 111 MMHG | HEART RATE: 68 BPM | DIASTOLIC BLOOD PRESSURE: 72 MMHG | WEIGHT: 189 LBS | HEIGHT: 60 IN | TEMPERATURE: 98 F | BODY MASS INDEX: 37.11 KG/M2

## 2023-09-21 DIAGNOSIS — Z12.11 COLON CANCER SCREENING: ICD-10-CM

## 2023-09-21 DIAGNOSIS — Z23 NEED FOR INFLUENZA VACCINATION: ICD-10-CM

## 2023-09-21 DIAGNOSIS — Z00.00 ROUTINE GENERAL MEDICAL EXAMINATION AT A HEALTH CARE FACILITY: ICD-10-CM

## 2023-09-21 DIAGNOSIS — I49.9 IRREGULAR HEARTBEAT: ICD-10-CM

## 2023-09-21 DIAGNOSIS — R07.9 CHEST PAIN, UNSPECIFIED TYPE: ICD-10-CM

## 2023-09-21 DIAGNOSIS — Z00.00 ROUTINE GENERAL MEDICAL EXAMINATION AT A HEALTH CARE FACILITY: Primary | ICD-10-CM

## 2023-09-21 DIAGNOSIS — K30 INDIGESTION: ICD-10-CM

## 2023-09-21 LAB
ALBUMIN SERPL BCP-MCNC: 4.3 G/DL (ref 3.5–5.2)
ALP SERPL-CCNC: 36 U/L (ref 55–135)
ALT SERPL W/O P-5'-P-CCNC: 24 U/L (ref 10–44)
ANION GAP SERPL CALC-SCNC: 10 MMOL/L (ref 8–16)
AST SERPL-CCNC: 18 U/L (ref 10–40)
BILIRUB SERPL-MCNC: 2.2 MG/DL (ref 0.1–1)
BUN SERPL-MCNC: 14 MG/DL (ref 6–20)
CALCIUM SERPL-MCNC: 9.7 MG/DL (ref 8.7–10.5)
CHLORIDE SERPL-SCNC: 105 MMOL/L (ref 95–110)
CHOLEST SERPL-MCNC: 198 MG/DL (ref 120–199)
CHOLEST/HDLC SERPL: 3.8 {RATIO} (ref 2–5)
CO2 SERPL-SCNC: 26 MMOL/L (ref 23–29)
CREAT SERPL-MCNC: 0.9 MG/DL (ref 0.5–1.4)
ERYTHROCYTE [DISTWIDTH] IN BLOOD BY AUTOMATED COUNT: 22.3 % (ref 11.5–14.5)
EST. GFR  (NO RACE VARIABLE): >60 ML/MIN/1.73 M^2
ESTIMATED AVG GLUCOSE: 85 MG/DL (ref 68–131)
GLUCOSE SERPL-MCNC: 92 MG/DL (ref 70–110)
HBA1C MFR BLD: 4.6 % (ref 4–5.6)
HCT VFR BLD AUTO: 37.6 % (ref 40–54)
HCV AB SERPL QL IA: NORMAL
HDLC SERPL-MCNC: 52 MG/DL (ref 40–75)
HDLC SERPL: 26.3 % (ref 20–50)
HGB BLD-MCNC: 12.1 G/DL (ref 14–18)
HIV 1+2 AB+HIV1 P24 AG SERPL QL IA: NORMAL
LDLC SERPL CALC-MCNC: 126 MG/DL (ref 63–159)
MCH RBC QN AUTO: 22.3 PG (ref 27–31)
MCHC RBC AUTO-ENTMCNC: 32.2 G/DL (ref 32–36)
MCV RBC AUTO: 69 FL (ref 82–98)
NONHDLC SERPL-MCNC: 146 MG/DL
PLATELET # BLD AUTO: 430 K/UL (ref 150–450)
PMV BLD AUTO: 10.7 FL (ref 9.2–12.9)
POTASSIUM SERPL-SCNC: 3.9 MMOL/L (ref 3.5–5.1)
PROT SERPL-MCNC: 7.6 G/DL (ref 6–8.4)
RBC # BLD AUTO: 5.43 M/UL (ref 4.6–6.2)
SODIUM SERPL-SCNC: 141 MMOL/L (ref 136–145)
TRIGL SERPL-MCNC: 100 MG/DL (ref 30–150)
TSH SERPL DL<=0.005 MIU/L-ACNC: 1.13 UIU/ML (ref 0.4–4)
WBC # BLD AUTO: 6.28 K/UL (ref 3.9–12.7)

## 2023-09-21 PROCEDURE — 84443 ASSAY THYROID STIM HORMONE: CPT | Performed by: INTERNAL MEDICINE

## 2023-09-21 PROCEDURE — 93005 ELECTROCARDIOGRAM TRACING: CPT

## 2023-09-21 PROCEDURE — 99999 PR PBB SHADOW E&M-EST. PATIENT-LVL III: CPT | Mod: PBBFAC,,, | Performed by: INTERNAL MEDICINE

## 2023-09-21 PROCEDURE — 99211 PR NURSE VISIT, 15 MINS, EXEC HLTH ONLY: ICD-10-PCS | Mod: S$GLB,,, | Performed by: INTERNAL MEDICINE

## 2023-09-21 PROCEDURE — 93010 EKG 12-LEAD: ICD-10-PCS | Mod: ,,, | Performed by: INTERNAL MEDICINE

## 2023-09-21 PROCEDURE — 85027 COMPLETE CBC AUTOMATED: CPT | Performed by: INTERNAL MEDICINE

## 2023-09-21 PROCEDURE — 99396 PR PREVENTIVE VISIT,EST,40-64: ICD-10-PCS | Mod: 25,S$GLB,, | Performed by: INTERNAL MEDICINE

## 2023-09-21 PROCEDURE — 83036 HEMOGLOBIN GLYCOSYLATED A1C: CPT | Performed by: INTERNAL MEDICINE

## 2023-09-21 PROCEDURE — 90686 IIV4 VACC NO PRSV 0.5 ML IM: CPT | Mod: S$GLB,,, | Performed by: INTERNAL MEDICINE

## 2023-09-21 PROCEDURE — 90686 FLU VACCINE (QUAD) GREATER THAN OR EQUAL TO 3YO PRESERVATIVE FREE IM: ICD-10-PCS | Mod: S$GLB,,, | Performed by: INTERNAL MEDICINE

## 2023-09-21 PROCEDURE — 71046 X-RAY EXAM CHEST 2 VIEWS: CPT | Mod: 26,,, | Performed by: RADIOLOGY

## 2023-09-21 PROCEDURE — 99199 UNLISTED SPECIAL SVC PX/RPRT: CPT | Mod: S$GLB,,, | Performed by: INTERNAL MEDICINE

## 2023-09-21 PROCEDURE — 99999 PR PBB SHADOW E&M-EST. PATIENT-LVL III: ICD-10-PCS | Mod: PBBFAC,,, | Performed by: INTERNAL MEDICINE

## 2023-09-21 PROCEDURE — 90471 IMMUNIZATION ADMIN: CPT | Mod: S$GLB,,, | Performed by: INTERNAL MEDICINE

## 2023-09-21 PROCEDURE — 87389 HIV-1 AG W/HIV-1&-2 AB AG IA: CPT | Performed by: INTERNAL MEDICINE

## 2023-09-21 PROCEDURE — 71046 X-RAY EXAM CHEST 2 VIEWS: CPT | Mod: TC

## 2023-09-21 PROCEDURE — 99211 OFF/OP EST MAY X REQ PHY/QHP: CPT | Mod: S$GLB,,, | Performed by: INTERNAL MEDICINE

## 2023-09-21 PROCEDURE — 99199 PR SPECIAL SERVICE/PROC/REPORT: ICD-10-PCS | Mod: S$GLB,,, | Performed by: INTERNAL MEDICINE

## 2023-09-21 PROCEDURE — 80061 LIPID PANEL: CPT | Performed by: INTERNAL MEDICINE

## 2023-09-21 PROCEDURE — 80053 COMPREHEN METABOLIC PANEL: CPT | Performed by: INTERNAL MEDICINE

## 2023-09-21 PROCEDURE — 71046 XR CHEST PA AND LATERAL: ICD-10-PCS | Mod: 26,,, | Performed by: RADIOLOGY

## 2023-09-21 PROCEDURE — 86803 HEPATITIS C AB TEST: CPT | Performed by: INTERNAL MEDICINE

## 2023-09-21 PROCEDURE — 90471 FLU VACCINE (QUAD) GREATER THAN OR EQUAL TO 3YO PRESERVATIVE FREE IM: ICD-10-PCS | Mod: S$GLB,,, | Performed by: INTERNAL MEDICINE

## 2023-09-21 PROCEDURE — 99396 PREV VISIT EST AGE 40-64: CPT | Mod: 25,S$GLB,, | Performed by: INTERNAL MEDICINE

## 2023-09-21 PROCEDURE — 93010 ELECTROCARDIOGRAM REPORT: CPT | Mod: ,,, | Performed by: INTERNAL MEDICINE

## 2023-09-21 NOTE — PROGRESS NOTES
Subjective:      Patient ID: Zach Khan is a 48 y.o. male.    Chief Complaint: Executive Health    HPI    C/o one year of irregular heartbeat.  Also has had some chest pain.  These symptoms usually occur together.  Symptoms occur approximally 2-3 times weekly.  No other associated symptoms during these episodes.  These can sometimes be associated with indigestion/belching however the majority of time symptoms occur without recognizable indigestion.  Episodes can last from 30 minutes to 2 hours.  Symptoms can occur day or night at home or at work or with leisure activities.  Can occur at rest.    It was a pleasure to meet you for your executive health physical on September 21, 2023.    Your a 48-year-old gentleman with a past medical history of mildly elevated coronary artery calcium score of 27, splenectomy, history unprovoked dvt (declines anticoagulation), beta thalassemia minor.     You currently take no medications.      You have no known drug allergies.      Family history:  Father- healthy  Mother- retinitis pigmentosa  Siblings- brother with hemorrhagic stroke.  Children- healthy    Preventative healthcare:   Tetanus vaccine-reported up-to-date  Flu vaccine- reports up-to-date  Colonoscopy- ordered on the day of your executive health exam.  Your we discussed recommended vaccinations due to splenectomy which were declined.        Review of Systems   Constitutional:  Negative for chills and fever.   HENT:  Negative for ear pain and sore throat.    Respiratory:  Negative for cough.    Cardiovascular:  Negative for chest pain.   Gastrointestinal:  Negative for abdominal pain and blood in stool.   Genitourinary:  Negative for dysuria and hematuria.   Neurological:  Negative for seizures and syncope.     Objective:   /72   Pulse 68   Temp 98.2 °F (36.8 °C)   Ht 5' (1.524 m)   Wt 85.7 kg (189 lb)   BMI 36.91 kg/m²     Physical Exam  Constitutional:       General: He is not in acute distress.      Appearance: He is well-developed.   HENT:      Head: Normocephalic and atraumatic.   Eyes:      Extraocular Movements: Extraocular movements intact.   Neck:      Thyroid: No thyromegaly.   Cardiovascular:      Rate and Rhythm: Normal rate and regular rhythm.   Pulmonary:      Breath sounds: Normal breath sounds. No wheezing or rales.   Abdominal:      General: Bowel sounds are normal.      Palpations: Abdomen is soft.      Tenderness: There is no abdominal tenderness.   Musculoskeletal:         General: No swelling.      Cervical back: Neck supple. No rigidity.   Lymphadenopathy:      Cervical: No cervical adenopathy.   Skin:     General: Skin is warm and dry.   Neurological:      Mental Status: He is alert and oriented to person, place, and time.   Psychiatric:         Behavior: Behavior normal.         Lab Results   Component Value Date    WBC 6.28 09/21/2023    HGB 12.1 (L) 09/21/2023    HGB 11.8 (L) 10/26/2020    HGB 12.5 (L) 06/07/2012    HCT 37.6 (L) 09/21/2023    MCV 69 (L) 09/21/2023    MCV 69 (L) 10/26/2020    MCV 70.4 (L) 06/07/2012     09/21/2023    CHOL 198 09/21/2023    TRIG 100 09/21/2023    HDL 52 09/21/2023    LDLCALC 126.0 09/21/2023    LDLCALC 112.0 10/26/2020    LDLCALC 126.0 06/07/2012    ALT 24 09/21/2023    AST 18 09/21/2023     09/21/2023    K 3.9 09/21/2023    CALCIUM 9.7 09/21/2023     09/21/2023    CO2 26 09/21/2023    BUN 14 09/21/2023    CREATININE 0.9 09/21/2023    CREATININE 0.9 10/26/2020    CREATININE 0.9 06/07/2012    EGFRNORACEVR >60 09/21/2023    TSH 0.899 10/26/2020    TSH 1.41 08/14/2020    PSA 0.33 10/26/2020    GLU 92 09/21/2023    HGBA1C 4.5 10/26/2020    HGBA1C 5.0 06/07/2012          The 10-year ASCVD risk score (Macrina ROD, et al., 2019) is: 2.1%    Values used to calculate the score:      Age: 48 years      Sex: Male      Is Non- : No      Diabetic: No      Tobacco smoker: No      Systolic Blood Pressure: 111 mmHg      Is BP  treated: No      HDL Cholesterol: 52 mg/dL      Total Cholesterol: 198 mg/dL     Assessment:     1. Routine general medical examination at a health care facility    2. Colon cancer screening    3. Need for influenza vaccination    4. Chest pain, unspecified type    5. Irregular heartbeat    6. Indigestion      Plan:   1. Routine general medical examination at a health care facility  -     HIV 1/2 Ag/Ab (4th Gen); Future; Expected date: 09/21/2023  -     Hepatitis C Antibody; Future; Expected date: 09/21/2023    2. Colon cancer screening  -     Ambulatory referral/consult to Endo Procedure ; Future; Expected date: 09/22/2023    3. Need for influenza vaccination  -     Influenza - Quadrivalent *Preferred* (6 months+) (PF)    4. Chest pain, unspecified type  -     Ambulatory referral/consult to Cardiology; Future; Expected date: 09/28/2023    5. Irregular heartbeat  -     Ambulatory referral/consult to Cardiology; Future; Expected date: 09/28/2023  -     EKG 12-lead; Future    6. Indigestion    Try over-the-counter PPI such as omeprazole, Nexium, Prevacid or a H2 blocker such as Pepcid or try Gas-X.    Try 1 of these options for 2-4 weeks to see if it resolves your symptoms.    There are no Patient Instructions on file for this visit.    No future appointments.        No follow-ups on file.

## 2023-09-22 ENCOUNTER — OFFICE VISIT (OUTPATIENT)
Dept: CARDIOLOGY | Facility: CLINIC | Age: 48
End: 2023-09-22
Payer: COMMERCIAL

## 2023-09-22 VITALS
HEIGHT: 72 IN | SYSTOLIC BLOOD PRESSURE: 111 MMHG | OXYGEN SATURATION: 97 % | HEART RATE: 77 BPM | BODY MASS INDEX: 26.16 KG/M2 | DIASTOLIC BLOOD PRESSURE: 82 MMHG | WEIGHT: 193.13 LBS

## 2023-09-22 DIAGNOSIS — R94.31 ABNORMAL ELECTROCARDIOGRAM DURING EXERCISE STRESS TEST: Primary | ICD-10-CM

## 2023-09-22 DIAGNOSIS — R07.9 CHEST PAIN, UNSPECIFIED TYPE: ICD-10-CM

## 2023-09-22 DIAGNOSIS — Z86.16 HISTORY OF 2019 NOVEL CORONAVIRUS DISEASE (COVID-19): ICD-10-CM

## 2023-09-22 DIAGNOSIS — Z86.718 HISTORY OF DVT OF LOWER EXTREMITY: ICD-10-CM

## 2023-09-22 DIAGNOSIS — R93.1 ELEVATED CORONARY ARTERY CALCIUM SCORE: ICD-10-CM

## 2023-09-22 DIAGNOSIS — Z90.81 HISTORY OF SPLENECTOMY: Chronic | ICD-10-CM

## 2023-09-22 DIAGNOSIS — Z90.81 THROMBOCYTOSIS AFTER SPLENECTOMY: ICD-10-CM

## 2023-09-22 DIAGNOSIS — I49.9 IRREGULAR HEARTBEAT: ICD-10-CM

## 2023-09-22 DIAGNOSIS — D75.838 THROMBOCYTOSIS AFTER SPLENECTOMY: ICD-10-CM

## 2023-09-22 PROCEDURE — 1160F RVW MEDS BY RX/DR IN RCRD: CPT | Mod: CPTII,S$GLB,, | Performed by: INTERNAL MEDICINE

## 2023-09-22 PROCEDURE — 1159F PR MEDICATION LIST DOCUMENTED IN MEDICAL RECORD: ICD-10-PCS | Mod: CPTII,S$GLB,, | Performed by: INTERNAL MEDICINE

## 2023-09-22 PROCEDURE — 3079F PR MOST RECENT DIASTOLIC BLOOD PRESSURE 80-89 MM HG: ICD-10-PCS | Mod: CPTII,S$GLB,, | Performed by: INTERNAL MEDICINE

## 2023-09-22 PROCEDURE — 99999 PR PBB SHADOW E&M-EST. PATIENT-LVL IV: ICD-10-PCS | Mod: PBBFAC,,, | Performed by: INTERNAL MEDICINE

## 2023-09-22 PROCEDURE — 99204 OFFICE O/P NEW MOD 45 MIN: CPT | Mod: S$GLB,,, | Performed by: INTERNAL MEDICINE

## 2023-09-22 PROCEDURE — 3008F PR BODY MASS INDEX (BMI) DOCUMENTED: ICD-10-PCS | Mod: CPTII,S$GLB,, | Performed by: INTERNAL MEDICINE

## 2023-09-22 PROCEDURE — 99999 PR PBB SHADOW E&M-EST. PATIENT-LVL IV: CPT | Mod: PBBFAC,,, | Performed by: INTERNAL MEDICINE

## 2023-09-22 PROCEDURE — 3074F SYST BP LT 130 MM HG: CPT | Mod: CPTII,S$GLB,, | Performed by: INTERNAL MEDICINE

## 2023-09-22 PROCEDURE — 3008F BODY MASS INDEX DOCD: CPT | Mod: CPTII,S$GLB,, | Performed by: INTERNAL MEDICINE

## 2023-09-22 PROCEDURE — 99204 PR OFFICE/OUTPT VISIT, NEW, LEVL IV, 45-59 MIN: ICD-10-PCS | Mod: S$GLB,,, | Performed by: INTERNAL MEDICINE

## 2023-09-22 PROCEDURE — 3074F PR MOST RECENT SYSTOLIC BLOOD PRESSURE < 130 MM HG: ICD-10-PCS | Mod: CPTII,S$GLB,, | Performed by: INTERNAL MEDICINE

## 2023-09-22 PROCEDURE — 3079F DIAST BP 80-89 MM HG: CPT | Mod: CPTII,S$GLB,, | Performed by: INTERNAL MEDICINE

## 2023-09-22 PROCEDURE — 1160F PR REVIEW ALL MEDS BY PRESCRIBER/CLIN PHARMACIST DOCUMENTED: ICD-10-PCS | Mod: CPTII,S$GLB,, | Performed by: INTERNAL MEDICINE

## 2023-09-22 PROCEDURE — 1159F MED LIST DOCD IN RCRD: CPT | Mod: CPTII,S$GLB,, | Performed by: INTERNAL MEDICINE

## 2023-09-22 PROCEDURE — 3044F HG A1C LEVEL LT 7.0%: CPT | Mod: CPTII,S$GLB,, | Performed by: INTERNAL MEDICINE

## 2023-09-22 PROCEDURE — 3044F PR MOST RECENT HEMOGLOBIN A1C LEVEL <7.0%: ICD-10-PCS | Mod: CPTII,S$GLB,, | Performed by: INTERNAL MEDICINE

## 2023-09-22 NOTE — PROGRESS NOTES
Subjective:   Patient ID:  Zach Khan is a 48 y.o. male who presents for cardiac consult of Chest Pain (Pt isn't having chest pains today but when pain is present, pain is rated at an 2 or 3. Pain is described as dull pain.)      Referral by: Satish Cardenas Md  [id number] [place] [address]     Reason for consult: CP      HPI  The patient came in today for cardiac consult of Chest Pain (Pt isn't having chest pains today but when pain is present, pain is rated at an 2 or 3. Pain is described as dull pain.)    9/2/2/23  Zach Khan is a 48 y.o. male pt with min elevated CA score, anemia, asplenia, h/o COVID 19, h/o DVT presents for CV eval of CP.       Pt saw Dr. New a few years prior  1.  History of DVT:  Stable observe no recurrence  2.  Family history of coronary disease:  Continued surveillance of low-cholesterol diet and repeat lipid profile next year.  Patient is a nonsmoker with no symptoms.  3.  Mildly elevated calcium score of the coronary arteries:  We had a discussion today about the low risk for the patient developing significant symptoms or coronary artery events in the next 10 years of 1.5% .  This patient has no desire to be on statin medication and given the low risk for cardiac events in the next 10 years I would advise continue surveillance with healthy lifestyle.  I answered all the patient's questions regarding presumed risk of developing CAD.  I will be available for follow-up in the future if necessary.    Pt today with normal BP and HR. BMI 26 - 193 lbs. ECG with NSR, cannot r/o anterior MI. He has heart racing events during the night, laying. Heart rate does get up quickly with yardwork/walking etc.   He has noticed higher pulse rate in the 70s, since about 18 months. Chest pain feels like tightness.     Patient feels no leg swelling, no PND, no palpitation, no dizziness, no syncope, no CNS symptoms.    Patient is compliant with medications. Working as  .     No results found for this or any previous visit.    FH - uncles mothers side -  in 40s , brother - had CVA     Normal sinus rhythm   Cannot rule out Anterior infarct (cited on or before 21-SEP-2023)   Abnormal ECG   When compared with ECG of 26-OCT-2020 08:17,   Previous ECG has undetermined rhythm, needs review   Questionable change in initial forces of Septal leads   Confirmed by FADY PATINO MD ([id number]) on 2023 6:07:55 PM       Results for orders placed during the hospital encounter of 10/26/20    Exercise Stress - EKG    Interpretation Summary    The EKG portion of this study is abnormal but not diagnostic.    The patient reported no chest pain during the stress test.    The blood pressure response to stress was normal.    The exercise capacity was normal.    Lateral T wave inversions with ST depressions resolved in recovery      No results found for this or any previous visit.      No cardiac monitor results found for the past 12 months         Past Medical History:   Diagnosis Date    Anemia 2020    Asplenia after surgical procedure 2020    Beta thalassemia minor 10/26/2020    History of COVD-19 (2020) 10/26/2020    History of DVT of lower extremity 2016    Spontaneous -- treated with Xarelto. Hematologist Dr. Steele did hypercoagulability work-up, which was negative. He was treated with Xarelto for just under 1 year. He was recommended to continue Xarelto life-long, but he declined.    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    Mildly elevated coronary artery calcium score     Nonspecific abnormal exercise cardiac stress test 2020    Thrombocytosis after splenectomy 2020       Past Surgical History:   Procedure Laterality Date    HERNIA REPAIR      spline removal         Social History     Tobacco Use    Smoking status: Never   Substance Use Topics    Alcohol use: Not Currently      Comment: rarely    Drug use: Never       Family History   Problem Relation Age of Onset    No Known Problems Mother     No Known Problems Father        Patient's Medications    No medications on file       Review of Systems   Constitutional: Negative.    HENT: Negative.     Eyes: Negative.    Respiratory: Negative.     Cardiovascular:  Positive for chest pain and palpitations.   Gastrointestinal: Negative.    Genitourinary: Negative.    Musculoskeletal: Negative.    Skin: Negative.    Neurological: Negative.    Endo/Heme/Allergies: Negative.    Psychiatric/Behavioral: Negative.     All 12 systems otherwise negative.      Wt Readings from Last 3 Encounters:   09/22/23 87.6 kg (193 lb 2 oz)   09/21/23 85.7 kg (189 lb)   10/26/20 86.9 kg (191 lb 9.3 oz)     Temp Readings from Last 3 Encounters:   09/21/23 98.2 °F (36.8 °C)   10/26/20 97 °F (36.1 °C) (Tympanic)   06/07/12 97.8 °F (36.6 °C)     BP Readings from Last 3 Encounters:   09/22/23 111/82   09/21/23 111/72   10/26/20 122/68     Pulse Readings from Last 3 Encounters:   09/22/23 77   09/21/23 68   10/26/20 66       /82 (BP Location: Left arm, Patient Position: Sitting, BP Method: Medium (Manual))   Pulse 77   Ht 6' (1.829 m)   Wt 87.6 kg (193 lb 2 oz)   SpO2 97%   BMI 26.19 kg/m²     Objective:   Physical Exam  Vitals and nursing note reviewed.   Constitutional:       General: He is not in acute distress.     Appearance: He is well-developed. He is not diaphoretic.   HENT:      Head: Normocephalic and atraumatic.      Nose: Nose normal.   Eyes:      General: No scleral icterus.     Conjunctiva/sclera: Conjunctivae normal.   Neck:      Thyroid: No thyromegaly.      Vascular: No JVD.   Cardiovascular:      Rate and Rhythm: Normal rate and regular rhythm.      Heart sounds: S1 normal and S2 normal. No murmur heard.     No friction rub. No gallop. No S3 or S4 sounds.   Pulmonary:      Effort: Pulmonary effort is normal. No respiratory distress.      Breath  "sounds: Normal breath sounds. No stridor. No wheezing or rales.   Chest:      Chest wall: No tenderness.   Abdominal:      General: Bowel sounds are normal. There is no distension.      Palpations: Abdomen is soft. There is no mass.      Tenderness: There is no abdominal tenderness. There is no rebound.   Genitourinary:     Comments: Deferred  Musculoskeletal:         General: No tenderness or deformity. Normal range of motion.      Cervical back: Normal range of motion and neck supple.   Lymphadenopathy:      Cervical: No cervical adenopathy.   Skin:     General: Skin is warm and dry.      Coloration: Skin is not pale.      Findings: No erythema or rash.   Neurological:      Mental Status: He is alert and oriented to person, place, and time.      Motor: No abnormal muscle tone.      Coordination: Coordination normal.   Psychiatric:         Behavior: Behavior normal.         Thought Content: Thought content normal.         Judgment: Judgment normal.         Lab Results   Component Value Date     09/21/2023    K 3.9 09/21/2023     09/21/2023    CO2 26 09/21/2023    BUN 14 09/21/2023    CREATININE 0.9 09/21/2023    GLU 92 09/21/2023    HGBA1C 4.6 09/21/2023    AST 18 09/21/2023    ALT 24 09/21/2023    ALBUMIN 4.3 09/21/2023    PROT 7.6 09/21/2023    BILITOT 2.2 (H) 09/21/2023    WBC 6.28 09/21/2023    HGB 12.1 (L) 09/21/2023    HCT 37.6 (L) 09/21/2023    MCV 69 (L) 09/21/2023     09/21/2023    TSH 1.133 09/21/2023    CHOL 198 09/21/2023    HDL 52 09/21/2023    LDLCALC 126.0 09/21/2023    TRIG 100 09/21/2023         No results found for: "BNP", "INR"       Assessment:      1. Nonspecific abnormal exercise cardiac stress test    2. Chest pain, unspecified type    3. Irregular heartbeat    4. Mildly elevated coronary artery calcium score    5. History of COVD-19 (06/23/2020)    6. History of splenectomy    7. Thrombocytosis after splenectomy    8. History of DVT of lower extremity        Plan: "     Chest pain, palpitations, elevated CA score  - prior ECG stress test abnormal  - order ECG treadmill nuclear stress test  - order ECHO  - order 7 day Vital monitor     2. H/O COVID 19 a few times   - stable now    3. H/o Splenectomy, thrombocytosis, h/o DVT   - no recurrence DVT  - cont to f/u PCP    Thank you for allowing me to participate in this patient's care. Please do not hesitate to contact me with any questions or concerns. Consult note has been forwarded to the referral physician.

## 2023-11-01 ENCOUNTER — HOSPITAL ENCOUNTER (OUTPATIENT)
Dept: PREADMISSION TESTING | Facility: HOSPITAL | Age: 48
Discharge: HOME | End: 2023-11-01
Attending: INTERNAL MEDICINE
Payer: COMMERCIAL

## 2023-11-01 ENCOUNTER — PATIENT MESSAGE (OUTPATIENT)
Dept: PREADMISSION TESTING | Facility: HOSPITAL | Age: 48
End: 2023-11-01

## 2023-11-01 DIAGNOSIS — Z12.11 COLON CANCER SCREENING: ICD-10-CM

## 2023-11-10 ENCOUNTER — HOSPITAL ENCOUNTER (OUTPATIENT)
Dept: CARDIOLOGY | Facility: HOSPITAL | Age: 48
Discharge: HOME | End: 2023-11-10
Attending: INTERNAL MEDICINE
Payer: COMMERCIAL

## 2023-11-10 ENCOUNTER — HOSPITAL ENCOUNTER (OUTPATIENT)
Dept: RADIOLOGY | Facility: HOSPITAL | Age: 48
Discharge: HOME | End: 2023-11-10
Attending: INTERNAL MEDICINE
Payer: COMMERCIAL

## 2023-11-10 ENCOUNTER — TELEPHONE (OUTPATIENT)
Dept: CARDIOLOGY | Facility: CLINIC | Age: 48
End: 2023-11-10
Payer: COMMERCIAL

## 2023-11-10 ENCOUNTER — DOCUMENTATION ONLY (OUTPATIENT)
Dept: CARDIOLOGY | Facility: HOSPITAL | Age: 48
End: 2023-11-10
Payer: COMMERCIAL

## 2023-11-10 VITALS
HEART RATE: 63 BPM | BODY MASS INDEX: 26.14 KG/M2 | HEIGHT: 72 IN | WEIGHT: 193 LBS | DIASTOLIC BLOOD PRESSURE: 82 MMHG | SYSTOLIC BLOOD PRESSURE: 111 MMHG

## 2023-11-10 DIAGNOSIS — I49.9 IRREGULAR HEARTBEAT: ICD-10-CM

## 2023-11-10 DIAGNOSIS — R07.9 CHEST PAIN, UNSPECIFIED TYPE: ICD-10-CM

## 2023-11-10 DIAGNOSIS — R94.31 ABNORMAL ELECTROCARDIOGRAM DURING EXERCISE STRESS TEST: ICD-10-CM

## 2023-11-10 DIAGNOSIS — D75.838 THROMBOCYTOSIS AFTER SPLENECTOMY: ICD-10-CM

## 2023-11-10 DIAGNOSIS — Z86.16 HISTORY OF 2019 NOVEL CORONAVIRUS DISEASE (COVID-19): ICD-10-CM

## 2023-11-10 DIAGNOSIS — R93.1 ELEVATED CORONARY ARTERY CALCIUM SCORE: ICD-10-CM

## 2023-11-10 DIAGNOSIS — Z90.81 HISTORY OF SPLENECTOMY: Chronic | ICD-10-CM

## 2023-11-10 DIAGNOSIS — Z90.81 THROMBOCYTOSIS AFTER SPLENECTOMY: ICD-10-CM

## 2023-11-10 LAB
AORTIC ROOT ANNULUS: 3.43 CM
ASCENDING AORTA: 2.4 CM
AV INDEX (PROSTH): 0.73
AV MEAN GRADIENT: 5 MMHG
AV PEAK GRADIENT: 10 MMHG
AV VALVE AREA BY VELOCITY RATIO: 2.52 CM²
AV VALVE AREA: 2.58 CM²
AV VELOCITY RATIO: 0.72
BSA FOR ECHO PROCEDURE: 2.11 M2
CV ECHO LV RWT: 0.53 CM
DOP CALC AO PEAK VEL: 1.58 M/S
DOP CALC AO VTI: 33.7 CM
DOP CALC LVOT AREA: 3.5 CM2
DOP CALC LVOT DIAMETER: 2.12 CM
DOP CALC LVOT PEAK VEL: 1.13 M/S
DOP CALC LVOT STROKE VOLUME: 86.79 CM3
DOP CALC RVOT PEAK VEL: 0.81 M/S
DOP CALC RVOT VTI: 18 CM
DOP CALCLVOT PEAK VEL VTI: 24.6 CM
E WAVE DECELERATION TIME: 152.35 MSEC
E/A RATIO: 1.31
E/E' RATIO: 6.42 M/S
ECHO LV POSTERIOR WALL: 1.12 CM (ref 0.6–1.1)
FRACTIONAL SHORTENING: 28 % (ref 28–44)
INTERVENTRICULAR SEPTUM: 1.27 CM (ref 0.6–1.1)
IVC DIAMETER: 2.18 CM
IVRT: 85.63 MSEC
LA MAJOR: 5.62 CM
LA MINOR: 5.59 CM
LA WIDTH: 3.8 CM
LEFT ATRIUM SIZE: 3.78 CM
LEFT ATRIUM VOLUME INDEX MOD: 31.2 ML/M2
LEFT ATRIUM VOLUME INDEX: 32.6 ML/M2
LEFT ATRIUM VOLUME MOD: 65.6 CM3
LEFT ATRIUM VOLUME: 68.43 CM3
LEFT INTERNAL DIMENSION IN SYSTOLE: 3.01 CM (ref 2.1–4)
LEFT VENTRICLE DIASTOLIC VOLUME INDEX: 37.24 ML/M2
LEFT VENTRICLE DIASTOLIC VOLUME: 78.21 ML
LEFT VENTRICLE MASS INDEX: 84 G/M2
LEFT VENTRICLE SYSTOLIC VOLUME INDEX: 16.8 ML/M2
LEFT VENTRICLE SYSTOLIC VOLUME: 35.18 ML
LEFT VENTRICULAR INTERNAL DIMENSION IN DIASTOLE: 4.19 CM (ref 3.5–6)
LEFT VENTRICULAR MASS: 176.43 G
LV LATERAL E/E' RATIO: 5.5 M/S
LV SEPTAL E/E' RATIO: 7.7 M/S
LVOT MG: 2.76 MMHG
LVOT MV: 0.78 CM/S
MV PEAK A VEL: 0.59 M/S
MV PEAK E VEL: 0.77 M/S
MV STENOSIS PRESSURE HALF TIME: 44.18 MS
MV VALVE AREA P 1/2 METHOD: 4.98 CM2
PISA TR MAX VEL: 2.7 M/S
PULM VEIN S/D RATIO: 0.8
PV MEAN GRADIENT: 1 MMHG
PV PEAK D VEL: 0.65 M/S
PV PEAK GRADIENT: 5 MMHG
PV PEAK S VEL: 0.52 M/S
PV PEAK VELOCITY: 1.11 M/S
RA MAJOR: 5.17 CM
RA PRESSURE ESTIMATED: 15 MMHG
RA WIDTH: 3.31 CM
RIGHT VENTRICULAR END-DIASTOLIC DIMENSION: 3.38 CM
RV TB RVSP: 18 MMHG
SINUS: 3.04 CM
STJ: 2.79 CM
TDI LATERAL: 0.14 M/S
TDI SEPTAL: 0.1 M/S
TDI: 0.12 M/S
TR MAX PG: 29 MMHG
TRICUSPID ANNULAR PLANE SYSTOLIC EXCURSION: 1.75 CM
TV REST PULMONARY ARTERY PRESSURE: 44 MMHG
Z-SCORE OF LEFT VENTRICULAR DIMENSION IN END DIASTOLE: -4.43
Z-SCORE OF LEFT VENTRICULAR DIMENSION IN END SYSTOLE: -2.23

## 2023-11-10 PROCEDURE — 93018 NUCLEAR STRESS - CARDIOLOGY INTERPRETED (CUPID ONLY): ICD-10-PCS | Mod: ,,, | Performed by: INTERNAL MEDICINE

## 2023-11-10 PROCEDURE — 93244 CV CARDIAC MONITOR - 3-15 DAY ADULT (CUPID ONLY): ICD-10-PCS | Mod: ,,, | Performed by: INTERNAL MEDICINE

## 2023-11-10 PROCEDURE — 93016 NUCLEAR STRESS - CARDIOLOGY INTERPRETED (CUPID ONLY): ICD-10-PCS | Mod: ,,, | Performed by: INTERNAL MEDICINE

## 2023-11-10 PROCEDURE — 93016 CV STRESS TEST SUPVJ ONLY: CPT | Mod: ,,, | Performed by: INTERNAL MEDICINE

## 2023-11-10 PROCEDURE — 93018 CV STRESS TEST I&R ONLY: CPT | Mod: ,,, | Performed by: INTERNAL MEDICINE

## 2023-11-10 PROCEDURE — 93306 TTE W/DOPPLER COMPLETE: CPT

## 2023-11-10 PROCEDURE — 78451 HT MUSCLE IMAGE SPECT SING: CPT

## 2023-11-10 PROCEDURE — 93306 TTE W/DOPPLER COMPLETE: CPT | Mod: 26,,, | Performed by: INTERNAL MEDICINE

## 2023-11-10 PROCEDURE — 93306 ECHO (CUPID ONLY): ICD-10-PCS | Mod: 26,,, | Performed by: INTERNAL MEDICINE

## 2023-11-10 PROCEDURE — 93017 CV STRESS TEST TRACING ONLY: CPT

## 2023-11-10 PROCEDURE — 78451 NUCLEAR STRESS - CARDIOLOGY INTERPRETED (CUPID ONLY): ICD-10-PCS | Mod: 26,,, | Performed by: INTERNAL MEDICINE

## 2023-11-10 PROCEDURE — 93244 EXT ECG>48HR<7D REV&INTERPJ: CPT | Mod: ,,, | Performed by: INTERNAL MEDICINE

## 2023-11-10 PROCEDURE — 78451 HT MUSCLE IMAGE SPECT SING: CPT | Mod: 26,,, | Performed by: INTERNAL MEDICINE

## 2023-11-10 NOTE — TELEPHONE ENCOUNTER
Returned call and spoke to pt. Pt was in this morning for a stress testing and holter monitor. Pt had an syncope episode but denied ambulance services but was a dvsied to visit the ER. Pt went home and his wife wants to see if pt needs to go to the ER due to pt being told about blockage from his echo. Pt and his wife would like to speak with Dr. Kong. Informed pt I will get this message to  to contact pt and his wife.    ----- Message from Boris Huber sent at 11/10/2023 11:01 AM CST -----  Contact: gissel  Patient declined emergency services at his appointment. He has made it home and now he is calling back to see if he needs to report to ER. Please call him back at [phone].      Thanks  DD

## 2023-11-10 NOTE — PROGRESS NOTES
Patient arrived for Exercise Nuclear Stress Test. During IV placement patient slumped over, LOC for seconds. Patient awaken to verbal and tactile stimuli, alert and oriented to person, place and time.  /72, HR 63 SR. Patient stated it's response to IV placement.  Continued monitoring of patient. Patient denied distress and waiting to proceed with Exercise Treadmill Nuclear Stress Test.  Pre work up reveal SR with 2nd Degree AV block (Mobitz II)   Blood pressure 106/87, HR 72  Patient Alert, oriented denies distress.  Patient asked to sit and consulted with Dr. Kong with orders to call 911 to transport to nearest ED.  0915 Dr. Kong arrive immediately to patient room, evaluated and spoke with patient describing findings. Patient refused ED treatment and eval.  Dr. Kong spent greater than 15 minutes speaking with patient, describing risk involved in refusing farther workup.  EMS arrive, patient refused. Patient was signed  AMA form and exit building.   AMA  scan to chart

## 2023-11-17 LAB
CV STRESS BASE HR: 70 BPM
NUC REST EJECTION FRACTION: 67
NUC STRESS EJECTION FRACTION: 67 %
OHS CV CPX 85 PERCENT MAX PREDICTED HEART RATE MALE: 146
OHS CV CPX MAX PREDICTED HEART RATE: 172
OHS CV CPX PATIENT IS FEMALE: 0
OHS CV CPX PATIENT IS MALE: 1
OHS CV CPX PEAK HEAR RATE: 74 BPM
OHS CV CPX PERCENT MAX PREDICTED HEART RATE ACHIEVED: 43

## 2023-11-22 LAB
OHS CV HOLTER SINUS AVERAGE HR: 78
OHS CV HOLTER SINUS MAX HR: 145
OHS CV HOLTER SINUS MIN HR: 53

## 2024-03-21 DIAGNOSIS — Z76.89 ENCOUNTER TO ESTABLISH CARE: ICD-10-CM

## 2024-03-21 DIAGNOSIS — Z00.00 ROUTINE ADULT HEALTH MAINTENANCE: Primary | ICD-10-CM

## 2024-04-03 ENCOUNTER — OFFICE VISIT (OUTPATIENT)
Dept: CARDIOLOGY | Facility: CLINIC | Age: 49
End: 2024-04-03
Payer: COMMERCIAL

## 2024-04-03 ENCOUNTER — HOSPITAL ENCOUNTER (OUTPATIENT)
Dept: CARDIOLOGY | Facility: HOSPITAL | Age: 49
Discharge: HOME | End: 2024-04-03
Attending: INTERNAL MEDICINE
Payer: COMMERCIAL

## 2024-04-03 VITALS
HEART RATE: 76 BPM | DIASTOLIC BLOOD PRESSURE: 88 MMHG | SYSTOLIC BLOOD PRESSURE: 122 MMHG | WEIGHT: 189.81 LBS | HEIGHT: 72 IN | OXYGEN SATURATION: 96 % | BODY MASS INDEX: 25.71 KG/M2

## 2024-04-03 DIAGNOSIS — R94.31 NONSPECIFIC ABNORMAL ELECTROCARDIOGRAM (ECG) (EKG): ICD-10-CM

## 2024-04-03 DIAGNOSIS — Z00.00 ROUTINE ADULT HEALTH MAINTENANCE: ICD-10-CM

## 2024-04-03 DIAGNOSIS — Z76.89 ENCOUNTER TO ESTABLISH CARE: ICD-10-CM

## 2024-04-03 DIAGNOSIS — R94.31 ABNORMAL ELECTROCARDIOGRAM DURING EXERCISE STRESS TEST: ICD-10-CM

## 2024-04-03 DIAGNOSIS — R07.9 CHEST PAIN, UNSPECIFIED TYPE: ICD-10-CM

## 2024-04-03 DIAGNOSIS — Z86.16 HISTORY OF 2019 NOVEL CORONAVIRUS DISEASE (COVID-19): ICD-10-CM

## 2024-04-03 DIAGNOSIS — I49.9 IRREGULAR HEARTBEAT: ICD-10-CM

## 2024-04-03 DIAGNOSIS — R93.1 ELEVATED CORONARY ARTERY CALCIUM SCORE: ICD-10-CM

## 2024-04-03 DIAGNOSIS — I49.3 FREQUENT PVCS: Primary | ICD-10-CM

## 2024-04-03 PROCEDURE — 93005 ELECTROCARDIOGRAM TRACING: CPT

## 2024-04-03 PROCEDURE — 3074F SYST BP LT 130 MM HG: CPT | Mod: CPTII,S$GLB,, | Performed by: INTERNAL MEDICINE

## 2024-04-03 PROCEDURE — 99999 PR PBB SHADOW E&M-EST. PATIENT-LVL IV: CPT | Mod: PBBFAC,,, | Performed by: INTERNAL MEDICINE

## 2024-04-03 PROCEDURE — 3008F BODY MASS INDEX DOCD: CPT | Mod: CPTII,S$GLB,, | Performed by: INTERNAL MEDICINE

## 2024-04-03 PROCEDURE — 3079F DIAST BP 80-89 MM HG: CPT | Mod: CPTII,S$GLB,, | Performed by: INTERNAL MEDICINE

## 2024-04-03 PROCEDURE — 99214 OFFICE O/P EST MOD 30 MIN: CPT | Mod: S$GLB,,, | Performed by: INTERNAL MEDICINE

## 2024-04-03 PROCEDURE — 1160F RVW MEDS BY RX/DR IN RCRD: CPT | Mod: CPTII,S$GLB,, | Performed by: INTERNAL MEDICINE

## 2024-04-03 PROCEDURE — 1159F MED LIST DOCD IN RCRD: CPT | Mod: CPTII,S$GLB,, | Performed by: INTERNAL MEDICINE

## 2024-04-03 PROCEDURE — 93010 ELECTROCARDIOGRAM REPORT: CPT | Mod: ,,, | Performed by: STUDENT IN AN ORGANIZED HEALTH CARE EDUCATION/TRAINING PROGRAM

## 2024-04-03 NOTE — PROGRESS NOTES
Subjective:   Patient ID:  Zach Khan is a 49 y.o. male who presents for cardiac consult of No chief complaint on file.      Referral by: No referring provider defined for this encounter.     Reason for consult: CONTRERAS TESFAYE  The patient came in today for cardiac consult of No chief complaint on file.      Zach Khan is a 49 y.o. male pt with min NSVT, elevated CA score, anemia, asplenia, h/o COVID 19, h/o DVT presents for follow up CV eval.     9/2/2/23  Pt saw Dr. New a few years prior  1.  History of DVT:  Stable observe no recurrence  2.  Family history of coronary disease:  Continued surveillance of low-cholesterol diet and repeat lipid profile next year.  Patient is a nonsmoker with no symptoms.  3.  Mildly elevated calcium score of the coronary arteries:  We had a discussion today about the low risk for the patient developing significant symptoms or coronary artery events in the next 10 years of 1.5% .  This patient has no desire to be on statin medication and given the low risk for cardiac events in the next 10 years I would advise continue surveillance with healthy lifestyle.  I answered all the patient's questions regarding presumed risk of developing CAD.  I will be available for follow-up in the future if necessary.    Pt today with normal BP and HR. BMI 26 - 193 lbs. ECG with NSR, cannot r/o anterior MI. He has heart racing events during the night, laying. Heart rate does get up quickly with yardwork/walking etc.   He has noticed higher pulse rate in the 70s, since about 18 months. Chest pain feels like tightness.       4/3/24    Nuclear stress test  - Returned call and spoke to pt. Pt was in this morning for a stress testing and holter monitor. Pt had an syncope episode but denied ambulance services but was a dvsied to visit the ER. Pt went home and his wife wants to see if pt needs to go to the ER due to pt being told about blockage from his echo. Pt and his wife would like to speak  with Dr. Kong. Informed pt I will get this message to  to contact pt and his wife.     ----- Message from Boris Huber sent at 11/10/2023 11:01 AM CST -----  Contact: gissel  Patient declined emergency services at his appointment. He has made it home and now he is calling back to see if he needs to report to ER. Please call him back at [phone].      ECHO 11/2023 with normal bi V function, mild TR , mild PI, inc CVP, PASP 44 mmHg.   Nuclear stress 11/2023 neg   Pt had Vital monitor 11/2023 - Overall PVC [place] at 11.99 %    Patient is compliant with medications. Working as .     Results for orders placed during the hospital encounter of 11/10/23    Echo    Interpretation Summary    Left Ventricle: The left ventricle is normal in size. Normal wall thickness. There is concentric remodeling. Normal wall motion. There is normal systolic function with a visually estimated ejection fraction of 60 - 65%. There is normal diastolic function.    Right Ventricle: Normal right ventricular cavity size. Wall thickness is normal. Right ventricle wall motion  is normal. Systolic function is borderline low.    Tricuspid Valve: There is mild regurgitation.    Pulmonic Valve: There is mild regurgitation.    Pulmonary Artery: There is pulmonary hypertension. The estimated pulmonary artery systolic pressure is 44 mmHg.    IVC/SVC: Elevated venous pressure at 15 mmHg.      Results for orders placed during the hospital encounter of 11/10/23    Nuclear Stress - Cardiology Interpreted    Interpretation Summary    Normal myocardial perfusion scan. There is no evidence of myocardial ischemia or infarction.    There is a  mild to moderate intensity fixed perfusion abnormality in the inferior wall of the left ventricle secondary to diaphragm attenuation.    The gated perfusion images showed an ejection fraction of 67% at rest. The gated perfusion images showed an ejection fraction of 67% post stress.    There is normal  wall motion at rest and post stress.    LV cavity size is normal at rest and normal at stress.    The ECG portion of the study is negative for ischemia.    Rest study only    No stress test was preformed. EMS was called and Patient left AMA. Dr. Kong notified. EMS was sent away.    Conclusion         The predominant rhythm is sinus.     The patient was monitored for a total of 6d 23h, underlying rhythm is Sinus.  The minimum heart rate was 53 bpm; the maximum 145 bpm; the average 78 bpm.  0 % of Atrial fibrillation/Atrial flutter with longest episode of 0 ms.  The total burden of AV Block present was 0 %   There were 0 pauses,  Total count of Ventricular Tachycardia (VT): 0 episode(s).   2 supraventricular episodes were found. Longest SVT Episode 4 beats, Fastest  bpm  There were a total of 64394 PVCs with 2 morphologies and 335 couplets. Overall PVC [place] at 11.99 %  There were a total of 0 Other Beats. There were 0 total number of paced beats.  There were a total of 7 PSVCs with 0 morphologies and 0 couplets. Overall PSVC [place] at < 0.01 %  There is a total of 33 patient events        No results found for this or any previous visit.    FH - uncles mothers side -  in 40s , brother - had CVA     Normal sinus rhythm   Cannot rule out Anterior infarct (cited on or before 21-SEP-2023)   Abnormal ECG   When compared with ECG of 26-OCT-2020 08:17,   Previous ECG has undetermined rhythm, needs review   Questionable change in initial forces of Septal leads   Confirmed by FADY PATINO MD ([id number]) on 2023 6:07:55 PM       Results for orders placed during the hospital encounter of 10/26/20    Exercise Stress - EKG    Interpretation Summary    The EKG portion of this study is abnormal but not diagnostic.    The patient reported no chest pain during the stress test.    The blood pressure response to stress was normal.    The exercise capacity was normal.    Lateral T wave inversions with ST depressions resolved  in recovery      No results found for this or any previous visit.      Cardiac Monitor - 3-15 Day Adult (Cupid Only)    Result Date: 11/22/2023    The predominant rhythm is sinus.    The patient was monitored for a total of 6d 23h, underlying rhythm is   Sinus.  The minimum heart rate was 53 bpm; the maximum 145 bpm; the average 78   bpm.  0 % of Atrial fibrillation/Atrial flutter with longest episode of 0 ms.  The total burden of AV Block present was 0 %   There were 0 pauses,  Total count of Ventricular Tachycardia (VT): 0 episode(s).   2 supraventricular episodes were found. Longest SVT Episode 4 beats,   Fastest  bpm  There were a total of 37258 PVCs with 2 morphologies and 335 couplets.   Overall PVC [place] at 11.99 %  There were a total of 0 Other Beats. There were 0 total number of paced   beats.  There were a total of 7 PSVCs with 0 morphologies and 0 couplets. Overall   PSVC [place] at < 0.01 %  There is a total of 33 patient events              Past Medical History:   Diagnosis Date    Anemia 11/1/2020    Asplenia after surgical procedure 11/1/2020    Beta thalassemia minor 10/26/2020    History of COVD-19 (06/23/2020) 10/26/2020    History of DVT of lower extremity 5/2/2016    Spontaneous -- treated with Xarelto. Hematologist Dr. Steele did hypercoagulability work-up, which was negative. He was treated with Xarelto for just under 1 year. He was recommended to continue Xarelto life-long, but he declined.    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    Mildly elevated coronary artery calcium score     Nonspecific abnormal exercise cardiac stress test 11/1/2020    Thrombocytosis after splenectomy 11/1/2020       Past Surgical History:   Procedure Laterality Date    HERNIA REPAIR      spline removal         Social History     Tobacco Use    Smoking status: Never   Substance Use Topics    Alcohol use: Not Currently     Comment:  rarely    Drug use: Never       Family History   Problem Relation Age of Onset    No Known Problems Mother     No Known Problems Father        Patient's Medications    No medications on file       Review of Systems   Constitutional: Negative.    HENT: Negative.     Eyes: Negative.    Respiratory: Negative.     Cardiovascular:  Positive for chest pain and palpitations.   Gastrointestinal: Negative.    Genitourinary: Negative.    Musculoskeletal: Negative.    Skin: Negative.    Neurological: Negative.    Endo/Heme/Allergies: Negative.    Psychiatric/Behavioral: Negative.     All 12 systems otherwise negative.      Wt Readings from Last 3 Encounters:   04/03/24 86.1 kg (189 lb 13.1 oz)   11/10/23 87.5 kg (193 lb)   09/22/23 87.6 kg (193 lb 2 oz)     Temp Readings from Last 3 Encounters:   09/21/23 98.2 °F (36.8 °C)   10/26/20 97 °F (36.1 °C) (Tympanic)   06/07/12 97.8 °F (36.6 °C)     BP Readings from Last 3 Encounters:   04/03/24 122/88   11/10/23 111/82   09/22/23 111/82     Pulse Readings from Last 3 Encounters:   04/03/24 76   11/10/23 63   09/22/23 77       /88 (BP Location: Right arm, Patient Position: Sitting, BP Method: Medium (Manual))   Pulse 76   Ht 6' (1.829 m)   Wt 86.1 kg (189 lb 13.1 oz)   SpO2 96%   BMI 25.74 kg/m²     Objective:   Physical Exam  Vitals and nursing note reviewed.   Constitutional:       General: He is not in acute distress.     Appearance: He is well-developed. He is not diaphoretic.   HENT:      Head: Normocephalic and atraumatic.      Nose: Nose normal.   Eyes:      General: No scleral icterus.     Conjunctiva/sclera: Conjunctivae normal.   Neck:      Thyroid: No thyromegaly.      Vascular: No JVD.   Cardiovascular:      Rate and Rhythm: Normal rate and regular rhythm.      Heart sounds: S1 normal and S2 normal. No murmur heard.     No friction rub. No gallop. No S3 or S4 sounds.   Pulmonary:      Effort: Pulmonary effort is normal. No respiratory distress.      Breath  "sounds: Normal breath sounds. No stridor. No wheezing or rales.   Chest:      Chest wall: No tenderness.   Abdominal:      General: Bowel sounds are normal. There is no distension.      Palpations: Abdomen is soft. There is no mass.      Tenderness: There is no abdominal tenderness. There is no rebound.   Genitourinary:     Comments: Deferred  Musculoskeletal:         General: No tenderness or deformity. Normal range of motion.      Cervical back: Normal range of motion and neck supple.   Lymphadenopathy:      Cervical: No cervical adenopathy.   Skin:     General: Skin is warm and dry.      Coloration: Skin is not pale.      Findings: No erythema or rash.   Neurological:      Mental Status: He is alert and oriented to person, place, and time.      Motor: No abnormal muscle tone.      Coordination: Coordination normal.   Psychiatric:         Behavior: Behavior normal.         Thought Content: Thought content normal.         Judgment: Judgment normal.         Lab Results   Component Value Date     09/21/2023    K 3.9 09/21/2023     09/21/2023    CO2 26 09/21/2023    BUN 14 09/21/2023    CREATININE 0.9 09/21/2023    GLU 92 09/21/2023    HGBA1C 4.6 09/21/2023    AST 18 09/21/2023    ALT 24 09/21/2023    ALBUMIN 4.3 09/21/2023    PROT 7.6 09/21/2023    BILITOT 2.2 (H) 09/21/2023    WBC 6.28 09/21/2023    HGB 12.1 (L) 09/21/2023    HCT 37.6 (L) 09/21/2023    MCV 69 (L) 09/21/2023     09/21/2023    TSH 1.133 09/21/2023    CHOL 198 09/21/2023    HDL 52 09/21/2023    LDLCALC 126.0 09/21/2023    TRIG 100 09/21/2023         No results found for: "BNP", "INR"       Assessment:      1. Frequent PVCs    2. Irregular heartbeat    3. Chest pain, unspecified type    4. Nonspecific abnormal exercise cardiac stress test    5. History of COVD-19 (06/23/2020)    6. Mildly elevated coronary artery calcium score    7. Nonspecific abnormal electrocardiogram (ECG) (EKG)          Plan:     Chest pain, palpitations, " elevated CA score, frequent PVCs  - prior ECG stress test abnormal  -ECHO 11/2023 with normal bi V function, mild TR , mild PI, inc CVP, PASP 44 mmHg.   -Nuclear stress 11/2023 neg but only resting imagines - repeat nuclaer stress with Lexiscan  -Pt had Vital monitor 11/2023 - Overall PVC [place] at 11.99 %, repeat Holter 48 hrs   - refer to EP     2. H/O COVID 19 a few times   - stable now    3. H/o Splenectomy, thrombocytosis, h/o DVT   - no recurrence DVT  - cont to f/u PCP    Visit today included increased complexity associated with the care of the episodic problem chest pain addressed and managing the longitudinal care of the patient due to the serious and/or complex managed problem(s) .      Thank you for allowing me to participate in this patient's care. Please do not hesitate to contact me with any questions or concerns. Consult note has been forwarded to the referral physician.

## 2024-04-04 LAB
OHS QRS DURATION: 90 MS
OHS QTC CALCULATION: 392 MS

## 2024-04-24 ENCOUNTER — HOSPITAL ENCOUNTER (OUTPATIENT)
Dept: CARDIOLOGY | Facility: HOSPITAL | Age: 49
Discharge: HOME | End: 2024-04-24
Attending: INTERNAL MEDICINE
Payer: COMMERCIAL

## 2024-04-24 ENCOUNTER — HOSPITAL ENCOUNTER (OUTPATIENT)
Dept: RADIOLOGY | Facility: HOSPITAL | Age: 49
Discharge: HOME | End: 2024-04-24
Attending: INTERNAL MEDICINE
Payer: COMMERCIAL

## 2024-04-24 DIAGNOSIS — I49.3 FREQUENT PVCS: ICD-10-CM

## 2024-04-24 DIAGNOSIS — R93.1 ELEVATED CORONARY ARTERY CALCIUM SCORE: ICD-10-CM

## 2024-04-24 DIAGNOSIS — R94.31 ABNORMAL ELECTROCARDIOGRAM DURING EXERCISE STRESS TEST: ICD-10-CM

## 2024-04-24 DIAGNOSIS — R94.31 NONSPECIFIC ABNORMAL ELECTROCARDIOGRAM (ECG) (EKG): ICD-10-CM

## 2024-04-24 DIAGNOSIS — I49.9 IRREGULAR HEARTBEAT: ICD-10-CM

## 2024-04-24 DIAGNOSIS — R07.9 CHEST PAIN, UNSPECIFIED TYPE: ICD-10-CM

## 2024-04-24 DIAGNOSIS — Z86.16 HISTORY OF 2019 NOVEL CORONAVIRUS DISEASE (COVID-19): ICD-10-CM

## 2024-04-24 LAB
CV STRESS BASE HR: 63 BPM
DIASTOLIC BLOOD PRESSURE: 79 MMHG
NUC REST EJECTION FRACTION: 71
NUC STRESS EJECTION FRACTION: 75 %
OHS CV CPX 85 PERCENT MAX PREDICTED HEART RATE MALE: 145
OHS CV CPX MAX PREDICTED HEART RATE: 171
OHS CV CPX PATIENT IS FEMALE: 0
OHS CV CPX PATIENT IS MALE: 1
OHS CV CPX PEAK DIASTOLIC BLOOD PRESSURE: 99 MMHG
OHS CV CPX PEAK HEAR RATE: 113 BPM
OHS CV CPX PEAK RATE PRESSURE PRODUCT: NORMAL
OHS CV CPX PEAK SYSTOLIC BLOOD PRESSURE: 130 MMHG
OHS CV CPX PERCENT MAX PREDICTED HEART RATE ACHIEVED: 66
OHS CV CPX RATE PRESSURE PRODUCT PRESENTING: 6993
SYSTOLIC BLOOD PRESSURE: 111 MMHG

## 2024-04-24 PROCEDURE — 93018 CV STRESS TEST I&R ONLY: CPT | Mod: ,,, | Performed by: INTERNAL MEDICINE

## 2024-04-24 PROCEDURE — A9502 TC99M TETROFOSMIN: HCPCS | Performed by: INTERNAL MEDICINE

## 2024-04-24 PROCEDURE — 93016 CV STRESS TEST SUPVJ ONLY: CPT | Mod: ,,, | Performed by: INTERNAL MEDICINE

## 2024-04-24 PROCEDURE — 63600175 PHARM REV CODE 636 W HCPCS: Performed by: INTERNAL MEDICINE

## 2024-04-24 PROCEDURE — 93017 CV STRESS TEST TRACING ONLY: CPT

## 2024-04-24 PROCEDURE — 93225 XTRNL ECG REC<48 HRS REC: CPT

## 2024-04-24 PROCEDURE — 78452 HT MUSCLE IMAGE SPECT MULT: CPT

## 2024-04-24 PROCEDURE — 78452 HT MUSCLE IMAGE SPECT MULT: CPT | Mod: 26,,, | Performed by: INTERNAL MEDICINE

## 2024-04-24 PROCEDURE — 93227 XTRNL ECG REC<48 HR R&I: CPT | Mod: ,,, | Performed by: INTERNAL MEDICINE

## 2024-04-24 RX ORDER — REGADENOSON 0.08 MG/ML
0.4 INJECTION, SOLUTION INTRAVENOUS ONCE
Status: COMPLETED | OUTPATIENT
Start: 2024-04-24 | End: 2024-04-24

## 2024-04-24 RX ADMIN — TETROFOSMIN 30 MILLICURIE: 1.38 INJECTION, POWDER, LYOPHILIZED, FOR SOLUTION INTRAVENOUS at 09:04

## 2024-04-24 RX ADMIN — REGADENOSON 0.4 MG: 0.08 INJECTION, SOLUTION INTRAVENOUS at 09:04

## 2024-04-24 RX ADMIN — TETROFOSMIN 9 MILLICURIE: 1.38 INJECTION, POWDER, LYOPHILIZED, FOR SOLUTION INTRAVENOUS at 08:04

## 2024-04-26 ENCOUNTER — OFFICE VISIT (OUTPATIENT)
Dept: CARDIOLOGY | Facility: CLINIC | Age: 49
End: 2024-04-26
Payer: COMMERCIAL

## 2024-04-26 VITALS
BODY MASS INDEX: 25.68 KG/M2 | HEIGHT: 72 IN | HEART RATE: 66 BPM | SYSTOLIC BLOOD PRESSURE: 124 MMHG | DIASTOLIC BLOOD PRESSURE: 80 MMHG | WEIGHT: 189.63 LBS

## 2024-04-26 DIAGNOSIS — Z90.81 THROMBOCYTOSIS AFTER SPLENECTOMY: ICD-10-CM

## 2024-04-26 DIAGNOSIS — D56.3 BETA THALASSEMIA MINOR: ICD-10-CM

## 2024-04-26 DIAGNOSIS — R94.31 ABNORMAL ELECTROCARDIOGRAM DURING EXERCISE STRESS TEST: ICD-10-CM

## 2024-04-26 DIAGNOSIS — E80.6 HYPERBILIRUBINEMIA: ICD-10-CM

## 2024-04-26 DIAGNOSIS — D64.9 ANEMIA, UNSPECIFIED TYPE: ICD-10-CM

## 2024-04-26 DIAGNOSIS — Z90.81 ASPLENIA AFTER SURGICAL PROCEDURE: Chronic | ICD-10-CM

## 2024-04-26 DIAGNOSIS — R93.1 ELEVATED CORONARY ARTERY CALCIUM SCORE: ICD-10-CM

## 2024-04-26 DIAGNOSIS — Z86.718 HISTORY OF DVT OF LOWER EXTREMITY: ICD-10-CM

## 2024-04-26 DIAGNOSIS — I49.2 JUNCTIONAL PREMATURE BEATS: ICD-10-CM

## 2024-04-26 DIAGNOSIS — D75.838 THROMBOCYTOSIS AFTER SPLENECTOMY: ICD-10-CM

## 2024-04-26 DIAGNOSIS — I49.3 FREQUENT PVCS: Primary | ICD-10-CM

## 2024-04-26 LAB
OHS CV EVENT MONITOR DAY: 0
OHS CV HOLTER LENGTH DECIMAL HOURS: 47.98
OHS CV HOLTER LENGTH HOURS: 47
OHS CV HOLTER LENGTH MINUTES: 59
OHS CV HOLTER SINUS AVERAGE HR: 82
OHS CV HOLTER SINUS MAX HR: 143
OHS CV HOLTER SINUS MIN HR: 54

## 2024-04-26 PROCEDURE — 1160F RVW MEDS BY RX/DR IN RCRD: CPT | Mod: CPTII,S$GLB,, | Performed by: INTERNAL MEDICINE

## 2024-04-26 PROCEDURE — 1159F MED LIST DOCD IN RCRD: CPT | Mod: CPTII,S$GLB,, | Performed by: INTERNAL MEDICINE

## 2024-04-26 PROCEDURE — 3079F DIAST BP 80-89 MM HG: CPT | Mod: CPTII,S$GLB,, | Performed by: INTERNAL MEDICINE

## 2024-04-26 PROCEDURE — 99999 PR PBB SHADOW E&M-EST. PATIENT-LVL III: CPT | Mod: PBBFAC,,, | Performed by: INTERNAL MEDICINE

## 2024-04-26 PROCEDURE — 3008F BODY MASS INDEX DOCD: CPT | Mod: CPTII,S$GLB,, | Performed by: INTERNAL MEDICINE

## 2024-04-26 PROCEDURE — 99205 OFFICE O/P NEW HI 60 MIN: CPT | Mod: S$GLB,,, | Performed by: INTERNAL MEDICINE

## 2024-04-26 PROCEDURE — 3074F SYST BP LT 130 MM HG: CPT | Mod: CPTII,S$GLB,, | Performed by: INTERNAL MEDICINE

## 2024-04-26 NOTE — PROGRESS NOTES
Subjective:   Patient ID:  Zach Khan is a 49 y.o. male     Chief complaint:R/O atrial fib (Referral from [facility])      HPI  New patient to me. (05/05/2024 )  Referred by Dr Kong for evaluation and management of palps/PVCs   --     Background as gleaned from patient's records :  49 y.o. male  with NSVT, elevated CA score, anemia, asplenia, h/o COVID 19, h/o DVT     He has heart racing events during the night, laying. Heart rate does get up quickly with yardwork/walking etc.   He has noticed higher pulse rate in the 70s, since about 18 months. Chest pain feels like tightness.         4/3/24  Pt was in this morning for a stress testing and holter monitor. Pt had an syncope episode but denied ambulance services but was advised  to visit the ER.   Eventually just went home     Had VC in oct 2023 - reported as follows:   The predominant rhythm is sinus.     The patient was monitored for a total of 6d 23h, underlying rhythm is Sinus.  The minimum heart rate was 53 bpm; the maximum 145 bpm; the average 78 bpm.  0 % of Atrial fibrillation/Atrial flutter with longest episode of 0 ms.  The total burden of AV Block present was 0 %   There were 0 pauses,  Total count of Ventricular Tachycardia (VT): 0 episode(s).   2 supraventricular episodes were found. Longest SVT Episode 4 beats, Fastest  bpm  There were a total of 31267 PVCs with 2 morphologies and 335 couplets. Overall PVC [place] at 11.99 %  There were a total of 0 Other Beats. There were 0 total number of paced beats.  There were a total of 7 PSVCs with 0 morphologies and 0 couplets. Overall PSVC [place] at < 0.01 %  There is a total of 33 patient events    Additional details gleaned from today's interview :  Initially, patient went for an elective health screening.  He complained of fluttering while lying down.  The started a whole series of investigations including the initial long time monitor that was reported with frequent PVCs.  I reviewed this  was data for the vital connect.  In fact these premature beats were not PVCs but rather late diastolic PJCs in when a bit earlier which show associated intermittent right bundle branch block aberration.  The 33 patient events were mostly chest discomfort.  PVCs were frequently noted when the associated rhythm strips were flagged.  As a result of the complaints of chest discomfort and apparent PVCs, a stress test was scheduled.  This required the initiation of an IV which caused him to have a vasovagal event.  He does have a needle phobia.  Is supposed to have an MRI of the heart to figure out what the so called PVCs are due to.  Eventually had his stress test and this was negative.  His echocardiogram is within normal limits.  I did personally review the study including the Doppler.  I believe that his PA pressure was over estimated and he has normal pulmonary pressures as well as normal RV systolic function.  I also personally reviewed his Holter data/strips.  The report mentions 1% PVC burden 16% PVC burden.  Both of these are in fact junctional beats with the 1% being junctional beats with right bundle branch block aberration.  Patient did not enter any symptoms in his diary.    No current outpatient medications on file.     No current facility-administered medications for this visit.       Review of Systems     Constitutional: Reviewed  for decreased appetite, weight gain and weight loss.   HENT: Reviewed for nosebleeds.    Eyes:  Reviewed for blurred vision and visual disturbance.   Cardiovascular: Reviewed for chest pain, claudication, cyanosis,dyspnea on exertion, leg swelling, orthopnea,paroxysmal nocturnal dyspnearregular heartbeats, palpitations, near-syncope, and syncope.   Respiratory: Reviewed for cough, shortness of breath, wheezing, sleep disturbances due to breathing and snoring, .    Endocrine: Reviewed for heat intolerance.   Hematologic/Lymphatic: Reviewed for easy bruisability/bleeding.   Skin:  Reviewed for rash.   Musculoskeletal: Reviewed for muscle weakness and myalgias.   Gastrointestinal: Reviewed for abdominal pain, anorexia, melena, nausea and vomiting.   Genitourinary: Reviewed for hesitancy, frequency, nocturia and incontinence.   Neurological: Reviewed for excessive daytime sleepiness, dizziness, vertigo, weakness, headaches, loss of balance and seizures,   Psychiatric/Behavioral:  Reviewed for insomnia, altered mental status, depression, anxiety and nervousness.       All symptoms reviewed above were negative except for none       Social History     Tobacco Use   Smoking Status Never   Smokeless Tobacco Not on file       reports that he does not currently use alcohol.   Past Medical History:   Diagnosis Date    Anemia 11/1/2020    Asplenia after surgical procedure 11/1/2020    Beta thalassemia minor 10/26/2020    History of COVD-19 (06/23/2020) 10/26/2020    History of DVT of lower extremity 5/2/2016    Spontaneous -- treated with Xarelto. Hematologist Dr. Steele did hypercoagulability work-up, which was negative. He was treated with Xarelto for just under 1 year. He was recommended to continue Xarelto life-long, but he declined.    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    Mildly elevated coronary artery calcium score     Nonspecific abnormal exercise cardiac stress test 11/1/2020    Thrombocytosis after splenectomy 11/1/2020     Family History   Problem Relation Name Age of Onset    No Known Problems Mother      No Known Problems Father       Social History     Socioeconomic History    Marital status: Single   Tobacco Use    Smoking status: Never   Substance and Sexual Activity    Alcohol use: Not Currently     Comment: rarely    Drug use: Never    Sexual activity: Yes     Partners: Female     Past Surgical History:   Procedure Laterality Date    HERNIA REPAIR      spline removal         Objective:   Physical  Exam  Vitals and nursing note reviewed.   Constitutional:       Appearance: Normal appearance. He is well-developed. He is not diaphoretic.   HENT:      Head: Normocephalic and atraumatic.      Right Ear: External ear normal.      Left Ear: External ear normal.   Eyes:      General:         Right eye: No discharge.         Left eye: No discharge.      Conjunctiva/sclera: Conjunctivae normal.      Right eye: Right conjunctiva is not injected.      Left eye: No hemorrhage.     Pupils: Pupils are equal, round, and reactive to light.   Neck:      Thyroid: No thyromegaly.      Vascular: No JVD.   Cardiovascular:      Rate and Rhythm: Normal rate and regular rhythm.      Chest Wall: PMI is not displaced.      Pulses: Intact distal pulses.           Carotid pulses are 2+ on the right side and 2+ on the left side.       Radial pulses are 2+ on the right side and 2+ on the left side.        Dorsalis pedis pulses are 2+ on the right side and 2+ on the left side.        Posterior tibial pulses are 2+ on the right side and 2+ on the left side.      Heart sounds: Normal heart sounds. No midsystolic click and no opening snap. No murmur heard.     No friction rub. No gallop.   Pulmonary:      Effort: Pulmonary effort is normal. No respiratory distress.      Breath sounds: Normal breath sounds. No wheezing or rales.   Chest:      Chest wall: No tenderness.   Abdominal:      Palpations: Abdomen is soft. There is no hepatomegaly.      Tenderness: There is no abdominal tenderness. There is no guarding or rebound.   Musculoskeletal:         General: No tenderness. Normal range of motion.      Cervical back: Neck supple.      Right knee: No swelling.      Left knee: No swelling.      Right lower leg: No swelling.      Left lower leg: No swelling.      Right ankle: No swelling.      Left ankle: No swelling.      Right foot: No swelling.      Left foot: No swelling.   Skin:     General: Skin is warm and dry.      Findings: No rash.  "  Neurological:      Mental Status: He is alert and oriented to person, place, and time.      Cranial Nerves: No cranial nerve deficit.      Coordination: Coordination normal.      Deep Tendon Reflexes: Reflexes are normal and symmetric.   Psychiatric:         Behavior: Behavior normal.       /80   Pulse 66   Ht 6' (1.829 m)   Wt 86 kg (189 lb 9.5 oz)   BMI 25.71 kg/m²       Results for orders placed during the hospital encounter of 11/10/23    Echo    Interpretation Summary    Left Ventricle: The left ventricle is normal in size. Normal wall thickness. There is concentric remodeling. Normal wall motion. There is normal systolic function with a visually estimated ejection fraction of 60 - 65%. There is normal diastolic function.    Right Ventricle: Normal right ventricular cavity size. Wall thickness is normal. Right ventricle wall motion  is normal. Systolic function is borderline low.    Tricuspid Valve: There is mild regurgitation.    Pulmonic Valve: There is mild regurgitation.    Pulmonary Artery: There is pulmonary hypertension. The estimated pulmonary artery systolic pressure is 44 mmHg.    IVC/SVC: Elevated venous pressure at 15 mmHg.    WBC   Date Value Ref Range Status   09/21/2023 6.28 3.90 - 12.70 K/uL Final     Hematocrit   Date Value Ref Range Status   09/21/2023 37.6 (L) 40.0 - 54.0 % Final     Hemoglobin   Date Value Ref Range Status   09/21/2023 12.1 (L) 14.0 - 18.0 g/dL Final     Lab Results   Component Value Date     09/21/2023     Lab Results   Component Value Date    CREATININE 0.9 09/21/2023    EGFRNORACEVR >60 09/21/2023    K 3.9 09/21/2023     No results found for: "BNP"         Assessment:    No serious cardiovascular disease.  He has premature beats.  They seem to be mostly PVCs.  On 1 of the EKGs however there were couple PACs.  These seemed to be from a low-lying focus near the AV conduction system.  1. Frequent PJCs    2. Junctional premature beats    3. Mildly elevated " coronary artery calcium score    4. Thrombocytosis after splenectomy    5. Nonspecific abnormal exercise cardiac stress test    6. Asplenia after surgical procedure    7. History of DVT of lower extremity    8. Beta thalassemia minor    9. Hyperbilirubinemia    10. Anemia, unspecified type        Plan:    Reassurance.  No need for further cardiovascular evaluation.  Health risk modification for long-term management of CAD prevention.  No orders of the defined types were placed in this encounter.    No follow-ups on file.  There are no discontinued medications.  No outpatient encounter medications on file as of 4/26/2024.     No facility-administered encounter medications on file as of 4/26/2024.     This note is at least partially dictated using the M*Modal Fluency Direct word recognition program. There are word recognition mistakes that are occasionally missed on review.     Pre-visit chart review: 35 min    Face to face time: 35 min, > 50% of which spent in education    I have reviewed the actual images of all relevant ECG, rhythm, holter and long term monitoring tracings available in EPIC, [facility]  and in [employer] as well as outside records.   I have reviewed the actual images of all relevant echo and chest x-ray.   Post visit chart documentation and care coordination: 15 min

## 2024-05-05 PROBLEM — I49.2: Status: ACTIVE | Noted: 2024-05-05
